# Patient Record
Sex: MALE | Race: WHITE | URBAN - METROPOLITAN AREA
[De-identification: names, ages, dates, MRNs, and addresses within clinical notes are randomized per-mention and may not be internally consistent; named-entity substitution may affect disease eponyms.]

---

## 2017-01-01 ENCOUNTER — DOCUMENTATION ONLY (OUTPATIENT)
Dept: ONCOLOGY | Facility: CLINIC | Age: 82
End: 2017-01-01

## 2017-01-01 ENCOUNTER — HOSPITAL ENCOUNTER (OUTPATIENT)
Dept: LAB | Facility: CLINIC | Age: 82
Discharge: HOME | End: 2017-01-18
Attending: INTERNAL MEDICINE | Admitting: INTERNAL MEDICINE
Payer: MEDICARE

## 2017-01-01 ENCOUNTER — CARE COORDINATION (OUTPATIENT)
Dept: CARE COORDINATION | Facility: CLINIC | Age: 82
End: 2017-01-01

## 2017-01-01 ENCOUNTER — TELEPHONE (OUTPATIENT)
Dept: ONCOLOGY | Facility: CLINIC | Age: 82
End: 2017-01-01

## 2017-01-01 ENCOUNTER — HOSPITAL ENCOUNTER (OUTPATIENT)
Dept: LAB | Facility: CLINIC | Age: 82
Discharge: HOME | End: 2017-01-11
Attending: INTERNAL MEDICINE | Admitting: INTERNAL MEDICINE
Payer: MEDICARE

## 2017-01-01 ENCOUNTER — HOSPITAL ENCOUNTER (EMERGENCY)
Facility: CLINIC | Age: 82
Discharge: HOME | End: 2017-01-11
Attending: PHYSICIAN ASSISTANT | Admitting: PHYSICIAN ASSISTANT
Payer: MEDICARE

## 2017-01-01 ENCOUNTER — APPOINTMENT (OUTPATIENT)
Dept: GENERAL RADIOLOGY | Facility: CLINIC | Age: 82
DRG: 808 | End: 2017-01-01
Attending: FAMILY MEDICINE
Payer: MEDICARE

## 2017-01-01 ENCOUNTER — HOSPITAL ENCOUNTER (INPATIENT)
Facility: CLINIC | Age: 82
LOS: 3 days | Discharge: HOSPICE | DRG: 808 | End: 2017-02-02
Attending: EMERGENCY MEDICINE | Admitting: FAMILY MEDICINE
Payer: MEDICARE

## 2017-01-01 ENCOUNTER — ONCOLOGY VISIT (OUTPATIENT)
Dept: ONCOLOGY | Facility: CLINIC | Age: 82
End: 2017-01-01
Attending: INTERNAL MEDICINE
Payer: MEDICARE

## 2017-01-01 VITALS
HEIGHT: 70 IN | HEART RATE: 78 BPM | BODY MASS INDEX: 22.16 KG/M2 | DIASTOLIC BLOOD PRESSURE: 68 MMHG | WEIGHT: 154.76 LBS | TEMPERATURE: 97.7 F | RESPIRATION RATE: 18 BRPM | SYSTOLIC BLOOD PRESSURE: 102 MMHG | OXYGEN SATURATION: 95 %

## 2017-01-01 VITALS
OXYGEN SATURATION: 98 % | TEMPERATURE: 96.8 F | SYSTOLIC BLOOD PRESSURE: 113 MMHG | BODY MASS INDEX: 24.83 KG/M2 | WEIGHT: 154.5 LBS | RESPIRATION RATE: 18 BRPM | HEIGHT: 66 IN | DIASTOLIC BLOOD PRESSURE: 61 MMHG | HEART RATE: 100 BPM

## 2017-01-01 VITALS
SYSTOLIC BLOOD PRESSURE: 115 MMHG | RESPIRATION RATE: 16 BRPM | HEART RATE: 96 BPM | TEMPERATURE: 97.7 F | BODY MASS INDEX: 24.83 KG/M2 | DIASTOLIC BLOOD PRESSURE: 66 MMHG | HEIGHT: 66 IN | WEIGHT: 154.5 LBS | OXYGEN SATURATION: 97 %

## 2017-01-01 VITALS — TEMPERATURE: 98.9 F | SYSTOLIC BLOOD PRESSURE: 113 MMHG | OXYGEN SATURATION: 95 % | DIASTOLIC BLOOD PRESSURE: 67 MMHG

## 2017-01-01 DIAGNOSIS — C90.00 METASTATIC MULTIPLE MYELOMA TO BONE (H): ICD-10-CM

## 2017-01-01 DIAGNOSIS — C79.51 BONE METASTASES: ICD-10-CM

## 2017-01-01 DIAGNOSIS — S51.011A SKIN TEAR OF ELBOW WITHOUT COMPLICATION, RIGHT, INITIAL ENCOUNTER: ICD-10-CM

## 2017-01-01 DIAGNOSIS — R62.7 FAILURE TO THRIVE IN ADULT: ICD-10-CM

## 2017-01-01 DIAGNOSIS — C90.00 MULTIPLE MYELOMA (H): ICD-10-CM

## 2017-01-01 DIAGNOSIS — C90.00 METASTATIC MULTIPLE MYELOMA TO BONE (H): Primary | ICD-10-CM

## 2017-01-01 DIAGNOSIS — C90.01 MULTIPLE MYELOMA IN REMISSION (H): Primary | ICD-10-CM

## 2017-01-01 DIAGNOSIS — C90.00 MULTIPLE MYELOMA NOT HAVING ACHIEVED REMISSION (H): ICD-10-CM

## 2017-01-01 DIAGNOSIS — R06.00 DYSPNEA, UNSPECIFIED TYPE: Primary | ICD-10-CM

## 2017-01-01 DIAGNOSIS — D64.9 ANEMIA: ICD-10-CM

## 2017-01-01 DIAGNOSIS — D64.9 ANEMIA, UNSPECIFIED TYPE: ICD-10-CM

## 2017-01-01 DIAGNOSIS — C90.01 MULTIPLE MYELOMA IN REMISSION (H): ICD-10-CM

## 2017-01-01 LAB
ABO + RH BLD: NORMAL
ABO + RH BLD: NORMAL
ALBUMIN SERPL ELPH-MCNC: 2.8 G/DL (ref 3.7–5.1)
ALBUMIN SERPL-MCNC: 1.2 G/DL (ref 3.4–5)
ALBUMIN SERPL-MCNC: 1.3 G/DL (ref 3.4–5)
ALBUMIN SERPL-MCNC: 1.9 G/DL (ref 3.4–5)
ALBUMIN UR-MCNC: NEGATIVE MG/DL
ALP SERPL-CCNC: 69 U/L (ref 40–150)
ALP SERPL-CCNC: 70 U/L (ref 40–150)
ALP SERPL-CCNC: 73 U/L (ref 40–150)
ALPHA1 GLOB SERPL ELPH-MCNC: 0.4 G/DL (ref 0.2–0.4)
ALPHA2 GLOB SERPL ELPH-MCNC: 0.6 G/DL (ref 0.5–0.9)
ALT SERPL W P-5'-P-CCNC: 7 U/L (ref 0–70)
ALT SERPL W P-5'-P-CCNC: 8 U/L (ref 0–70)
ALT SERPL W P-5'-P-CCNC: 9 U/L (ref 0–70)
ANION GAP SERPL CALCULATED.3IONS-SCNC: 10 MMOL/L (ref 3–14)
ANION GAP SERPL CALCULATED.3IONS-SCNC: 11 MMOL/L (ref 3–14)
ANION GAP SERPL CALCULATED.3IONS-SCNC: 8 MMOL/L (ref 3–14)
ANISOCYTOSIS BLD QL SMEAR: ABNORMAL
APPEARANCE UR: CLEAR
APTT PPP: 70 SEC (ref 22–37)
APTT PPP: 77 SEC (ref 22–37)
AST SERPL W P-5'-P-CCNC: 14 U/L (ref 0–45)
AST SERPL W P-5'-P-CCNC: 14 U/L (ref 0–45)
AST SERPL W P-5'-P-CCNC: 15 U/L (ref 0–45)
B-GLOBULIN SERPL ELPH-MCNC: 0.8 G/DL (ref 0.6–1)
BASOPHILS # BLD AUTO: 0 10E9/L (ref 0–0.2)
BASOPHILS # BLD AUTO: 0 10E9/L (ref 0–0.2)
BASOPHILS NFR BLD AUTO: 0.3 %
BASOPHILS NFR BLD AUTO: 1.1 %
BILIRUB SERPL-MCNC: 0.2 MG/DL (ref 0.2–1.3)
BILIRUB SERPL-MCNC: 0.2 MG/DL (ref 0.2–1.3)
BILIRUB SERPL-MCNC: 0.3 MG/DL (ref 0.2–1.3)
BILIRUB UR QL STRIP: NEGATIVE
BLD GP AB SCN SERPL QL: NORMAL
BLD PROD TYP BPU: NORMAL
BLD PROD TYP BPU: NORMAL
BLD UNIT ID BPU: 0
BLOOD BANK CMNT PATIENT-IMP: NORMAL
BLOOD PRODUCT CODE: NORMAL
BPU ID: NORMAL
BUN SERPL-MCNC: 28 MG/DL (ref 7–30)
BUN SERPL-MCNC: 32 MG/DL (ref 7–30)
BUN SERPL-MCNC: 42 MG/DL (ref 7–30)
CALCIUM SERPL-MCNC: 8.4 MG/DL (ref 8.5–10.1)
CALCIUM SERPL-MCNC: 8.6 MG/DL (ref 8.5–10.1)
CALCIUM SERPL-MCNC: 9.5 MG/DL (ref 8.5–10.1)
CHLORIDE SERPL-SCNC: 104 MMOL/L (ref 94–109)
CHLORIDE SERPL-SCNC: 106 MMOL/L (ref 94–109)
CHLORIDE SERPL-SCNC: 107 MMOL/L (ref 94–109)
CK SERPL-CCNC: 32 U/L (ref 30–300)
CO2 SERPL-SCNC: 22 MMOL/L (ref 20–32)
CO2 SERPL-SCNC: 23 MMOL/L (ref 20–32)
CO2 SERPL-SCNC: 25 MMOL/L (ref 20–32)
COLOR UR AUTO: YELLOW
CREAT SERPL-MCNC: 1.27 MG/DL (ref 0.66–1.25)
CREAT SERPL-MCNC: 1.34 MG/DL (ref 0.66–1.25)
CREAT SERPL-MCNC: 1.68 MG/DL (ref 0.66–1.25)
DIFFERENTIAL METHOD BLD: ABNORMAL
DIFFERENTIAL METHOD BLD: ABNORMAL
EOSINOPHIL # BLD AUTO: 0 10E9/L (ref 0–0.7)
EOSINOPHIL # BLD AUTO: 0 10E9/L (ref 0–0.7)
EOSINOPHIL NFR BLD AUTO: 0.6 %
EOSINOPHIL NFR BLD AUTO: 0.9 %
ERYTHROCYTE [DISTWIDTH] IN BLOOD BY AUTOMATED COUNT: 19.5 % (ref 10–15)
ERYTHROCYTE [DISTWIDTH] IN BLOOD BY AUTOMATED COUNT: 19.6 % (ref 10–15)
ERYTHROCYTE [DISTWIDTH] IN BLOOD BY AUTOMATED COUNT: 20.8 % (ref 10–15)
GAMMA GLOB SERPL ELPH-MCNC: 8.3 G/DL (ref 0.7–1.6)
GFR SERPL CREATININE-BSD FRML MDRD: 39 ML/MIN/1.7M2
GFR SERPL CREATININE-BSD FRML MDRD: 51 ML/MIN/1.7M2
GFR SERPL CREATININE-BSD FRML MDRD: 54 ML/MIN/1.7M2
GLUCOSE SERPL-MCNC: 117 MG/DL (ref 70–99)
GLUCOSE SERPL-MCNC: 81 MG/DL (ref 70–99)
GLUCOSE SERPL-MCNC: 87 MG/DL (ref 70–99)
GLUCOSE UR STRIP-MCNC: NEGATIVE MG/DL
HCT VFR BLD AUTO: 19.8 % (ref 40–53)
HCT VFR BLD AUTO: 20.6 % (ref 40–53)
HCT VFR BLD AUTO: 25.1 % (ref 40–53)
HGB BLD-MCNC: 6.4 G/DL (ref 13.3–17.7)
HGB BLD-MCNC: 6.7 G/DL (ref 13.3–17.7)
HGB BLD-MCNC: 8.1 G/DL (ref 13.3–17.7)
HGB UR QL STRIP: NEGATIVE
IGA SERPL-MCNC: 7980 MG/DL (ref 70–380)
IGG SERPL-MCNC: 188 MG/DL (ref 695–1620)
IGM SERPL-MCNC: 5 MG/DL (ref 60–265)
IMM GRANULOCYTES # BLD: 0 10E9/L (ref 0–0.4)
IMM GRANULOCYTES # BLD: 0 10E9/L (ref 0–0.4)
IMM GRANULOCYTES NFR BLD: 0.3 %
IMM GRANULOCYTES NFR BLD: 0.6 %
INR PPP: 1.61 (ref 0.86–1.14)
INR PPP: 1.84 (ref 0.86–1.14)
KAPPA LC UR-MCNC: 2.28 MG/DL (ref 0.33–1.94)
KAPPA LC/LAMBDA SER: 6.16 {RATIO} (ref 0.26–1.65)
KETONES UR STRIP-MCNC: NEGATIVE MG/DL
LAMBDA LC SERPL-MCNC: 0.37 MG/DL (ref 0.57–2.63)
LEUKOCYTE ESTERASE UR QL STRIP: NEGATIVE
LYMPHOCYTES # BLD AUTO: 1.6 10E9/L (ref 0.8–5.3)
LYMPHOCYTES # BLD AUTO: 1.7 10E9/L (ref 0.8–5.3)
LYMPHOCYTES NFR BLD AUTO: 43.6 %
LYMPHOCYTES NFR BLD AUTO: 48.1 %
M PROTEIN SERPL ELPH-MCNC: 8 G/DL
MCH RBC QN AUTO: 33 PG (ref 26.5–33)
MCH RBC QN AUTO: 33.6 PG (ref 26.5–33)
MCH RBC QN AUTO: 33.8 PG (ref 26.5–33)
MCHC RBC AUTO-ENTMCNC: 32.3 G/DL (ref 31.5–36.5)
MCHC RBC AUTO-ENTMCNC: 32.3 G/DL (ref 31.5–36.5)
MCHC RBC AUTO-ENTMCNC: 32.5 G/DL (ref 31.5–36.5)
MCV RBC AUTO: 102 FL (ref 78–100)
MCV RBC AUTO: 104 FL (ref 78–100)
MCV RBC AUTO: 104 FL (ref 78–100)
MONOCYTES # BLD AUTO: 0.2 10E9/L (ref 0–1.3)
MONOCYTES # BLD AUTO: 0.2 10E9/L (ref 0–1.3)
MONOCYTES NFR BLD AUTO: 4.9 %
MONOCYTES NFR BLD AUTO: 6.4 %
NEUTROPHILS # BLD AUTO: 1.6 10E9/L (ref 1.6–8.3)
NEUTROPHILS # BLD AUTO: 1.7 10E9/L (ref 1.6–8.3)
NEUTROPHILS NFR BLD AUTO: 45.2 %
NEUTROPHILS NFR BLD AUTO: 48 %
NITRATE UR QL: NEGATIVE
NUM BPU REQUESTED: 1
PH UR STRIP: 6.5 PH (ref 5–7)
PLATELET # BLD AUTO: 113 10E9/L (ref 150–450)
PLATELET # BLD AUTO: 93 10E9/L (ref 150–450)
PLATELET # BLD AUTO: 96 10E9/L (ref 150–450)
PLATELET # BLD EST: ABNORMAL 10*3/UL
POTASSIUM SERPL-SCNC: 4.3 MMOL/L (ref 3.4–5.3)
POTASSIUM SERPL-SCNC: 4.3 MMOL/L (ref 3.4–5.3)
POTASSIUM SERPL-SCNC: 4.5 MMOL/L (ref 3.4–5.3)
PROT PATTERN SERPL ELPH-IMP: ABNORMAL
PROT SERPL-MCNC: 11.3 G/DL (ref 6.8–8.8)
PROT SERPL-MCNC: 11.7 G/DL (ref 6.8–8.8)
PROT SERPL-MCNC: 12.4 G/DL (ref 6.8–8.8)
RBC # BLD AUTO: 1.94 10E12/L (ref 4.4–5.9)
RBC # BLD AUTO: 1.98 10E12/L (ref 4.4–5.9)
RBC # BLD AUTO: 2.41 10E12/L (ref 4.4–5.9)
ROULEAUX BLD QL SMEAR: ABNORMAL
SODIUM SERPL-SCNC: 137 MMOL/L (ref 133–144)
SODIUM SERPL-SCNC: 139 MMOL/L (ref 133–144)
SODIUM SERPL-SCNC: 140 MMOL/L (ref 133–144)
SP GR UR STRIP: 1.01 (ref 1–1.03)
SPECIMEN EXP DATE BLD: NORMAL
TRANSFUSION STATUS PATIENT QL: NORMAL
TRANSFUSION STATUS PATIENT QL: NORMAL
URN SPEC COLLECT METH UR: NORMAL
UROBILINOGEN UR STRIP-MCNC: NORMAL MG/DL (ref 0–2)
WBC # BLD AUTO: 3.5 10E9/L (ref 4–11)
WBC # BLD AUTO: 3.6 10E9/L (ref 4–11)
WBC # BLD AUTO: 3.8 10E9/L (ref 4–11)

## 2017-01-01 PROCEDURE — 12000007 ZZH R&B INTERMEDIATE

## 2017-01-01 PROCEDURE — 99356 ZZC PROLONGED SERV,INPATIENT,1ST HR: CPT | Performed by: NURSE PRACTITIONER

## 2017-01-01 PROCEDURE — 86901 BLOOD TYPING SEROLOGIC RH(D): CPT | Performed by: EMERGENCY MEDICINE

## 2017-01-01 PROCEDURE — 99212 OFFICE O/P EST SF 10 MIN: CPT

## 2017-01-01 PROCEDURE — 99214 OFFICE O/P EST MOD 30 MIN: CPT | Performed by: INTERNAL MEDICINE

## 2017-01-01 PROCEDURE — 99213 OFFICE O/P EST LOW 20 MIN: CPT | Performed by: PHYSICIAN ASSISTANT

## 2017-01-01 PROCEDURE — 25000132 ZZH RX MED GY IP 250 OP 250 PS 637: Mod: GY | Performed by: FAMILY MEDICINE

## 2017-01-01 PROCEDURE — A9270 NON-COVERED ITEM OR SERVICE: HCPCS | Mod: GY | Performed by: NURSE PRACTITIONER

## 2017-01-01 PROCEDURE — 25000125 ZZHC RX 250: Performed by: FAMILY MEDICINE

## 2017-01-01 PROCEDURE — 86900 BLOOD TYPING SEROLOGIC ABO: CPT | Performed by: EMERGENCY MEDICINE

## 2017-01-01 PROCEDURE — 85025 COMPLETE CBC W/AUTO DIFF WBC: CPT | Performed by: STUDENT IN AN ORGANIZED HEALTH CARE EDUCATION/TRAINING PROGRAM

## 2017-01-01 PROCEDURE — 86923 COMPATIBILITY TEST ELECTRIC: CPT | Performed by: EMERGENCY MEDICINE

## 2017-01-01 PROCEDURE — 82550 ASSAY OF CK (CPK): CPT | Performed by: FAMILY MEDICINE

## 2017-01-01 PROCEDURE — A9270 NON-COVERED ITEM OR SERVICE: HCPCS | Mod: GY | Performed by: FAMILY MEDICINE

## 2017-01-01 PROCEDURE — 99239 HOSP IP/OBS DSCHRG MGMT >30: CPT | Performed by: INTERNAL MEDICINE

## 2017-01-01 PROCEDURE — 85730 THROMBOPLASTIN TIME PARTIAL: CPT | Performed by: FAMILY MEDICINE

## 2017-01-01 PROCEDURE — 99285 EMERGENCY DEPT VISIT HI MDM: CPT | Mod: 25

## 2017-01-01 PROCEDURE — 80053 COMPREHEN METABOLIC PANEL: CPT | Performed by: STUDENT IN AN ORGANIZED HEALTH CARE EDUCATION/TRAINING PROGRAM

## 2017-01-01 PROCEDURE — 99211 OFF/OP EST MAY X REQ PHY/QHP: CPT

## 2017-01-01 PROCEDURE — 80053 COMPREHEN METABOLIC PANEL: CPT | Performed by: FAMILY MEDICINE

## 2017-01-01 PROCEDURE — 85610 PROTHROMBIN TIME: CPT | Performed by: EMERGENCY MEDICINE

## 2017-01-01 PROCEDURE — P9016 RBC LEUKOCYTES REDUCED: HCPCS | Performed by: EMERGENCY MEDICINE

## 2017-01-01 PROCEDURE — 36430 TRANSFUSION BLD/BLD COMPNT: CPT

## 2017-01-01 PROCEDURE — 36415 COLL VENOUS BLD VENIPUNCTURE: CPT | Performed by: INTERNAL MEDICINE

## 2017-01-01 PROCEDURE — 12000000 ZZH R&B MED SURG/OB

## 2017-01-01 PROCEDURE — 99231 SBSQ HOSP IP/OBS SF/LOW 25: CPT | Performed by: INTERNAL MEDICINE

## 2017-01-01 PROCEDURE — 36415 COLL VENOUS BLD VENIPUNCTURE: CPT | Performed by: FAMILY MEDICINE

## 2017-01-01 PROCEDURE — 80053 COMPREHEN METABOLIC PANEL: CPT | Performed by: INTERNAL MEDICINE

## 2017-01-01 PROCEDURE — 81003 URINALYSIS AUTO W/O SCOPE: CPT | Performed by: STUDENT IN AN ORGANIZED HEALTH CARE EDUCATION/TRAINING PROGRAM

## 2017-01-01 PROCEDURE — 99223 1ST HOSP IP/OBS HIGH 75: CPT | Mod: AI | Performed by: FAMILY MEDICINE

## 2017-01-01 PROCEDURE — 99285 EMERGENCY DEPT VISIT HI MDM: CPT | Performed by: EMERGENCY MEDICINE

## 2017-01-01 PROCEDURE — 85025 COMPLETE CBC W/AUTO DIFF WBC: CPT | Performed by: INTERNAL MEDICINE

## 2017-01-01 PROCEDURE — 71020 XR CHEST 2 VW: CPT

## 2017-01-01 PROCEDURE — 85027 COMPLETE CBC AUTOMATED: CPT | Performed by: FAMILY MEDICINE

## 2017-01-01 PROCEDURE — 25000132 ZZH RX MED GY IP 250 OP 250 PS 637: Mod: GY | Performed by: NURSE PRACTITIONER

## 2017-01-01 PROCEDURE — 85730 THROMBOPLASTIN TIME PARTIAL: CPT | Performed by: EMERGENCY MEDICINE

## 2017-01-01 PROCEDURE — 86850 RBC ANTIBODY SCREEN: CPT | Performed by: EMERGENCY MEDICINE

## 2017-01-01 PROCEDURE — 85610 PROTHROMBIN TIME: CPT | Performed by: FAMILY MEDICINE

## 2017-01-01 PROCEDURE — 99223 1ST HOSP IP/OBS HIGH 75: CPT | Performed by: NURSE PRACTITIONER

## 2017-01-01 RX ORDER — ACETAMINOPHEN 325 MG/1
650 TABLET ORAL EVERY 4 HOURS PRN
Status: DISCONTINUED | OUTPATIENT
Start: 2017-01-01 | End: 2017-01-01 | Stop reason: HOSPADM

## 2017-01-01 RX ORDER — MORPHINE SULFATE 20 MG/ML
5-10 SOLUTION ORAL
Qty: 30 ML | Refills: 0 | Status: SHIPPED | OUTPATIENT
Start: 2017-01-01

## 2017-01-01 RX ORDER — PHYTONADIONE 5 MG/1
5 TABLET ORAL ONCE
Status: COMPLETED | OUTPATIENT
Start: 2017-01-01 | End: 2017-01-01

## 2017-01-01 RX ORDER — ONDANSETRON 2 MG/ML
4 INJECTION INTRAMUSCULAR; INTRAVENOUS EVERY 6 HOURS PRN
Status: DISCONTINUED | OUTPATIENT
Start: 2017-01-01 | End: 2017-01-01 | Stop reason: HOSPADM

## 2017-01-01 RX ORDER — METOPROLOL TARTRATE 25 MG/1
25 TABLET, FILM COATED ORAL 2 TIMES DAILY
Status: DISCONTINUED | OUTPATIENT
Start: 2017-01-01 | End: 2017-01-01 | Stop reason: HOSPADM

## 2017-01-01 RX ORDER — PROCHLORPERAZINE 25 MG
12.5 SUPPOSITORY, RECTAL RECTAL EVERY 12 HOURS PRN
Status: DISCONTINUED | OUTPATIENT
Start: 2017-01-01 | End: 2017-01-01 | Stop reason: HOSPADM

## 2017-01-01 RX ORDER — OXYCODONE HYDROCHLORIDE 5 MG/1
5-10 TABLET ORAL
Status: DISCONTINUED | OUTPATIENT
Start: 2017-01-01 | End: 2017-01-01 | Stop reason: HOSPADM

## 2017-01-01 RX ORDER — NALOXONE HYDROCHLORIDE 0.4 MG/ML
.1-.4 INJECTION, SOLUTION INTRAMUSCULAR; INTRAVENOUS; SUBCUTANEOUS
Status: DISCONTINUED | OUTPATIENT
Start: 2017-01-01 | End: 2017-01-01 | Stop reason: HOSPADM

## 2017-01-01 RX ORDER — AMOXICILLIN 250 MG
1-2 CAPSULE ORAL AT BEDTIME
Qty: 100 TABLET | Refills: 0 | DISCHARGE
Start: 2017-01-01

## 2017-01-01 RX ORDER — POTASSIUM CHLORIDE 1500 MG/1
20 TABLET, EXTENDED RELEASE ORAL DAILY
Status: DISCONTINUED | OUTPATIENT
Start: 2017-01-01 | End: 2017-01-01 | Stop reason: HOSPADM

## 2017-01-01 RX ORDER — LISINOPRIL 2.5 MG/1
2.5 TABLET ORAL DAILY
Status: DISCONTINUED | OUTPATIENT
Start: 2017-01-01 | End: 2017-01-01

## 2017-01-01 RX ORDER — MORPHINE SULFATE 20 MG/ML
5 SOLUTION ORAL
Status: DISCONTINUED | OUTPATIENT
Start: 2017-01-01 | End: 2017-01-01 | Stop reason: HOSPADM

## 2017-01-01 RX ORDER — PROCHLORPERAZINE MALEATE 5 MG
5 TABLET ORAL EVERY 6 HOURS PRN
Status: DISCONTINUED | OUTPATIENT
Start: 2017-01-01 | End: 2017-01-01 | Stop reason: HOSPADM

## 2017-01-01 RX ORDER — ONDANSETRON 4 MG/1
4 TABLET, ORALLY DISINTEGRATING ORAL EVERY 6 HOURS PRN
Status: DISCONTINUED | OUTPATIENT
Start: 2017-01-01 | End: 2017-01-01 | Stop reason: HOSPADM

## 2017-01-01 RX ORDER — AMOXICILLIN 250 MG
1-2 CAPSULE ORAL AT BEDTIME
Status: DISCONTINUED | OUTPATIENT
Start: 2017-01-01 | End: 2017-01-01 | Stop reason: HOSPADM

## 2017-01-01 RX ORDER — POLYETHYLENE GLYCOL 3350 17 G/17G
17 POWDER, FOR SOLUTION ORAL DAILY PRN
Status: DISCONTINUED | OUTPATIENT
Start: 2017-01-01 | End: 2017-01-01 | Stop reason: HOSPADM

## 2017-01-01 RX ADMIN — PHYTONADIONE 5 MG: 5 TABLET ORAL at 21:00

## 2017-01-01 RX ADMIN — OMEPRAZOLE 20 MG: 20 CAPSULE, DELAYED RELEASE ORAL at 08:05

## 2017-01-01 RX ADMIN — METOPROLOL TARTRATE 25 MG: 25 TABLET ORAL at 08:34

## 2017-01-01 RX ADMIN — METOPROLOL TARTRATE 25 MG: 25 TABLET ORAL at 08:22

## 2017-01-01 RX ADMIN — NEPAFENAC 1 DROP: 1 SUSPENSION/ DROPS OPHTHALMIC at 08:06

## 2017-01-01 RX ADMIN — POTASSIUM CHLORIDE 20 MEQ: 1500 TABLET, EXTENDED RELEASE ORAL at 08:05

## 2017-01-01 RX ADMIN — OMEPRAZOLE 20 MG: 20 CAPSULE, DELAYED RELEASE ORAL at 08:34

## 2017-01-01 RX ADMIN — LISINOPRIL 2.5 MG: 2.5 TABLET ORAL at 08:05

## 2017-01-01 RX ADMIN — NEPAFENAC 1 DROP: 1 SUSPENSION/ DROPS OPHTHALMIC at 08:24

## 2017-01-01 RX ADMIN — NEPAFENAC 1 DROP: 1 SUSPENSION/ DROPS OPHTHALMIC at 08:34

## 2017-01-01 RX ADMIN — POTASSIUM CHLORIDE 20 MEQ: 1500 TABLET, EXTENDED RELEASE ORAL at 08:34

## 2017-01-01 RX ADMIN — METOPROLOL TARTRATE 25 MG: 25 TABLET ORAL at 18:49

## 2017-01-01 RX ADMIN — METOPROLOL TARTRATE 25 MG: 25 TABLET ORAL at 20:25

## 2017-01-01 RX ADMIN — POTASSIUM CHLORIDE 20 MEQ: 1500 TABLET, EXTENDED RELEASE ORAL at 08:23

## 2017-01-01 RX ADMIN — METOPROLOL TARTRATE 25 MG: 25 TABLET ORAL at 08:05

## 2017-01-01 RX ADMIN — OMEPRAZOLE 20 MG: 20 CAPSULE, DELAYED RELEASE ORAL at 08:23

## 2017-01-01 RX ADMIN — METOPROLOL TARTRATE 25 MG: 25 TABLET ORAL at 21:00

## 2017-01-01 RX ADMIN — NEPAFENAC 1 DROP: 1 SUSPENSION/ DROPS OPHTHALMIC at 18:48

## 2017-01-01 RX ADMIN — NEPAFENAC 1 DROP: 1 SUSPENSION/ DROPS OPHTHALMIC at 20:25

## 2017-01-01 ASSESSMENT — ENCOUNTER SYMPTOMS
DIZZINESS: 0
BLOOD IN STOOL: 0
SEIZURES: 0
VOMITING: 0
SHORTNESS OF BREATH: 0
NAUSEA: 0
CHILLS: 0
NECK PAIN: 0
FATIGUE: 0
CHEST TIGHTNESS: 0
SORE THROAT: 0
FEVER: 0
PALPITATIONS: 0
COUGH: 1
HEADACHES: 0
BACK PAIN: 1
WEAKNESS: 1
STRIDOR: 0
ABDOMINAL PAIN: 0
SPEECH DIFFICULTY: 0
DIARRHEA: 1
WHEEZING: 0
CONSTIPATION: 0
BRUISES/BLEEDS EASILY: 1
DYSURIA: 0
FREQUENCY: 0
NUMBNESS: 0
CONFUSION: 0
DIAPHORESIS: 0
LIGHT-HEADEDNESS: 0

## 2017-01-01 ASSESSMENT — ACTIVITIES OF DAILY LIVING (ADL)
RETIRED_COMMUNICATION: 0-->UNDERSTANDS/COMMUNICATES WITHOUT DIFFICULTY
FALL_HISTORY_WITHIN_LAST_SIX_MONTHS: NO
SWALLOWING: 0-->SWALLOWS FOODS/LIQUIDS WITHOUT DIFFICULTY
TRANSFERRING: 2-->ASSISTIVE PERSON
BATHING: 2-->ASSISTIVE PERSON
TOILETING: 2-->ASSISTIVE PERSON
DRESS: 2-->ASSISTIVE PERSON
RETIRED_EATING: 0-->INDEPENDENT
COMMUNICATION: 0-->UNDERSTANDS/COMMUNICATES WITHOUT DIFFICULTY
AMBULATION: 1-->ASSISTIVE EQUIPMENT
CHANGE_IN_FUNCTIONAL_STATUS_SINCE_ONSET_OF_CURRENT_ILLNESS/INJURY: OTHER (SEE COMMENTS)
TRANSFERRING: 1-->ASSISTIVE EQUIPMENT
BATHING: 2-->ASSISTIVE PERSON
TOILETING: 2-->ASSISTIVE PERSON
SWALLOWING: 0-->SWALLOWS FOODS/LIQUIDS WITHOUT DIFFICULTY
COGNITION: 1 - ATTENTION OR MEMORY DEFICITS
DRESS: 2-->ASSISTIVE PERSON
EATING: 0-->INDEPENDENT
AMBULATION: 1-->ASSISTIVE EQUIPMENT

## 2017-01-01 ASSESSMENT — PAIN SCALES - GENERAL
PAINLEVEL: NO PAIN (0)
PAINLEVEL: MODERATE PAIN (5)

## 2017-01-10 NOTE — PROGRESS NOTES
Clinic Care Coordination Contact  Care Team Conversations    Patient was last contacted 10/4/16 date. Chart reviewed. No concerns noted at this time. Patient has not outreached to RN CC.  Patient has a letter with my contact information and access plan to reference if needed. Will close to active CC outreaches at this time.    Cata Yang RN, [facility]  RN Care Coordinator  [facility]  Phone # [phone]  1/10/2017 9:22 AM

## 2017-01-11 NOTE — ASSESSMENT & PLAN NOTE
Keven Jasso was diagnosis with ISS Stage II IgA kappa multiple myeloma, high risk with complex cytogenetics and karyotype 53 XY. He was initially admitted to the hospital on 11/27/2012 for generalized fatigue, loss of energy, muscle aches and exertional dyspnea. He was found to have hemoglobin of 8.8, creatinine 2.4, calcium 13.9 with a total serum protein of 12.3. Further evaluation revealed a monoclonal protein of 6.5 gram per deciliter IgA kappa and free light chain ratio 1.7, IgA of 2300. His skeletal survey also revealed multiple bone lesions consistent with multiple myeloma. Bone marrow evaluation on 12/04/2012 showed 75% monoclonal plasma cells and chromosomal analysis showed 53xy karyotype. There is a gain of 1 extra copy of chromosomes pgs 3, 5, 9, 15 and 19 as well as the gain of 2 extra copies of chromosome 21 so there was also evidence of translocation 8:12 and 8:16 as well with deletion of 6 and 14 and chromosome 17. Patient was started on Decadron 40 mg p.o. weekly and Velcade 1.3 mg per m2 days 1, 4, 8 and 11 for cycle 1 followed by weekly Velcade. He had VGPR (very good partial response). His M protein went down to 0.2 g/dL, and his hemoglobin and creatinine and serum calcium as well as pain within normal limits. His FLC was also within normal limits. M-protein was 0.5 g/dl on 02/19/14. Because of disease progression, the patient started on chemotherapy with Revlimid, and dexamethasone. Because of disease progression the patient was started on Pomalidamide and dexamethasone. Cytoxan was also added however the patient continued to progress.

## 2017-01-11 NOTE — PATIENT INSTRUCTIONS
We would like to see you back in clinic with Dr. Mesa in 1 week.  We are sending you to the ER for Xray, wound dressing, and admittance until you can be placed in a nursing home.  Chemotherapy will be delayed for 1 week.  Copy of appointments, and after visit summary (AVS) given to patient.  If you have any questions during business hours (M-F 8 AM- 4PM), please call Olga Jiménez RN, BSN, OCN Oncology Hematology /Breast Cancer Navigator at [facility] [phone].   For questions after business hours, or on holidays/weekends, please call our after hours Nurse Triage line [phone]. Thank you.

## 2017-01-11 NOTE — ED AVS SNAPSHOT
[facility] Emergency Department    [address] [facility] [address] [address]    Phone:  [phone]    Fax:  [phone]                                       Keven Jasso   MRN: [id number]    Department:  [facility] Emergency Department   Date of Visit:  1/11/2017           After Visit Summary Signature Page     I have received my discharge instructions, and my questions have been answered. I have discussed any challenges I see with this plan with the nurse or doctor.    ..........................................................................................................................................  Patient/Patient Representative Signature      ..........................................................................................................................................  Patient Representative Print Name and Relationship to Patient    ..................................................               ................................................  Date                                            Time    ..........................................................................................................................................  Reviewed by Signature/Title    ...................................................              ..............................................  Date                                                            Time

## 2017-01-11 NOTE — DISCHARGE INSTRUCTIONS
Old Laceration: Not Sutured  A laceration is a cut through the skin. This will usually require stitches if it is deep. However, if a laceration remains open for too long, the risk of infection increases. In your case, too much time has passed before coming for treatment. The danger of infection from suturing at this time is too high. That is why your wound was not sutured.  If the wound is spread open, it will heal by filling in from the bottom and sides. A wound that is not sutured may take 1 to 4 weeks to heal, depending on the size of the opening. A visible scar will probably occur. You can discuss revision of the scar with your healthcare provider at a later time.  Home care  The following guidelines will help you care for your laceration at home:    Keep the wound clean and dry. If a bandage was applied and it becomes wet or dirty, replace it. Otherwise, leave it in place for the first 24 hours, then change it once a day or as directed.    Clean the wound daily:    After removing any bandage, wash the area with soap and water. Use a wet cotton swab to loosen and remove any blood or crust that forms.    Talk with your doctor before applying any antibiotic ointment to the wound. Reapply a fresh bandage.    You may remove the bandage to shower as usual after the first 24 hours, but do not soak the area in water (no tub baths or swimming) for the next five days. Avoid activities that may reinjure your wound.    The healthcare provider may prescribe an antibiotic cream or ointment to prevent infection. Do not stop using this medication until you have finished the prescribed course or the provider tells you to stop.    The healthcare provider may also prescribe medications for pain. Follow instructions for taking these medications.    Check the wound daily for signs of infection listed below.  Follow-up care  Follow up with your healthcare provider as advised.  When to seek medical advice  Call your healthcare  provider right away if any of these occur:    Wound bleeding not controlled by direct pressure    Signs of infection, including increasing pain in the wound, increasing wound redness or swelling, or pus or bad odor coming from the wound    Fever of 100.4 F (38. C) or higher or as directed by your healthcare provider    Wound edges re-open    Wound changes colors    Numbness around the wound     Decreased movement around the injured area    [id number] [employer]. [address] [place] [address], [place] [id number]. All rights reserved. This information is not intended as a substitute for professional medical care. Always follow your healthcare professional's instructions.

## 2017-01-11 NOTE — ED PROVIDER NOTES
History     Chief Complaint   Patient presents with     Laceration     HPI  Keven Jasso is a 83 year old male who returns or laceration to his right elbow which occurred one week ago.  He and family states he sustained a superficial skin tag to the area after he hit it on something.  Since then he has had intermittent bleeding from the lesion with dressing changes which they are performing every other day.  Today when he was receiving infusion therapy provider noted a significant amount of dried blood on the forearm and recommended he present to urgent care for dressing change and evaluation.  He denies any significant pain in the area.  He has not had any new onset erythema, distal numbness or paresthesias.  No concerns of a foreign body in the wound.  His tetanus vaccine is up-to-date.    Past Medical History   Diagnosis Date     Pulmonary valve disorders      Cardiac Valvular Disease     Profound impairment, one eye, impairment level not further specified      Right Eye Blindness     Unspecified essential hypertension      Other and unspecified hyperlipidemia      Current Outpatient Prescriptions   Medication Sig Dispense Refill     ferrous sulfate (IRON) 325 (65 FE) MG tablet Take 1 tablet (325 mg) by mouth 2 times daily 60 tablet 11     potassium chloride SA (K-DUR/KLOR-CON M) 20 MEQ CR tablet Take 1 tablet (20 mEq) by mouth daily 30 tablet 0     omeprazole (PRILOSEC) 20 MG capsule Take 1 capsule (20 mg) by mouth daily 30 capsule 3     furosemide (LASIX) 20 MG tablet Take 1 tablet (20 mg) by mouth daily 30 tablet 3     metoprolol (LOPRESSOR) 25 MG tablet Take 1 tablet (25 mg) by mouth 2 times daily 180 tablet 3     multivitamin, therapeutic with minerals (THERA-VIT-M) TABS Take 1 tablet by mouth daily 30 each 0     LISINOPRIL PO Take 2.5 mg by mouth daily        nepafenac (NEVANAC) 0.1 % ophthalmic suspension Place 1 drop Into the left eye 2 times daily 1 Bottle 3     Social History   Substance Use  Topics     Smoking status: Former Smoker -- 1.00 packs/day for 30 years     Types: Pipe, Cigars     Quit date: 03/15/1991     Smokeless tobacco: Never Used     Alcohol Use: Yes      Comment: VERY RARE     I have reviewed the Medications, Allergies, Past Medical and Surgical History, and Social History in the Epic system.    Review of Systems  CONSTITUTIONAL:NEGATIVE for new onset fever, chills,   INTEGUMENTARY/SKIN: POSITIVE for laceration/skin tag to arm with intermittent bleeding  RESP:NEGATIVE for significant cough or SOB  MUSCULOSKELETAL: NEGATIVE for new concerning significant arthralgias or myalgia  NEURO: NEGATIVE for distal numbness or paresthesias in his right upper extremity    Physical Exam   /67 mmHg  Temp(Src) 98.9  F (37.2  C) (Temporal)  SpO2 95%  Physical Exam  GENERAL APPEARANCE:alert and no acute distress  RESP: lungs clear to auscultation - no rales, rhonchi or wheezes  CV: regular rates and rhythm, normal S1 S2, no murmur noted, right radial pulse intact  SKIN:  Large amount of dried blood on right forearm and elbow, once removed there is a skin tear/abrasion which measures approximately 5 cm by 7 cm on the lateral aspect of the right elbow, no active bleeding at this time.    MS:  Normal ROM of right elbow, no focal bony tenderness to palpation  ED Course   Procedures        Critical Care time:  none            Labs Ordered and Resulted from Time of ED Arrival Up to the Time of Departure from the ED - No data to display    Assessments & Plan (with Medical Decision Making)     I have reviewed the nursing notes.    I have reviewed the findings, diagnosis, plan and need for follow up with the patient.    Discharge Medication List as of 1/11/2017  3:27 PM        Final diagnoses:   Skin tear of elbow without complication, right, initial encounter     83-year-old who presents to urgent care with concerns of her laceration/abrasion to his right elbow which occurred one week prior to arrival but  continues to have intermittent bleeding.  Patient has stable vital signs upon arrival.  Physical exam findings were significant for a large amount of dried blood on his right forearm.  This was removed with saline by LPN ultimately revealing a 5 cm x 7 cm skin tear/superficial abrasion to the lateral aspect of his right elbow without any active bleeding at this time.  Dressing instructions discussed with patient and family member.  Patient has home nursing who can assist with regular dressing changes.  Expected course of healing, signs of infection, worrisome reasons to return to ER/UC discussed.  Follow up as needed.      Disclaimer: This note consists of symbols derived from keyboarding, dictation, and/or voice recognition software. As a result, there may be errors in the script that have gone undetected.  Please consider this when interpreting information found in the chart.    1/11/2017   [facility] EMERGENCY DEPARTMENT      Janiya Durand PA-C  01/21/17 1126

## 2017-01-11 NOTE — NURSING NOTE
"Keven Jasso is a 83 year old male who presents for:  Chief Complaint   Patient presents with     Oncology Clinic Visit     6 week recheck Multiple Myeloma, Labs & Chemo today        Initial Vitals:  /61 mmHg  Pulse 100  Temp(Src) 96.8  F (36  C) (Tympanic)  Resp 18  Ht 1.676 m (5' 5.98\")  Wt 70.081 kg (154 lb 8 oz)  BMI 24.95 kg/m2  SpO2 98% Estimated body mass index is 24.95 kg/(m^2) as calculated from the following:    Height as of this encounter: 1.676 m (5' 5.98\").    Weight as of this encounter: 70.081 kg (154 lb 8 oz).. Body surface area is 1.81 meters squared. BP completed using cuff size: regular  Moderate Pain (5) No LMP for male patient. Allergies and medications reviewed.     Medications: Medication refills not needed today.  Pharmacy name entered into [employer]: [facility] [place], [place] - [address] [place] [address]    Comments: 6 week recheck Multiple Myeloma, Labs & Chemo today. Patient reports coughing for the last 1-2 weeks. Gilmar nose is bleeding today & I noticed some scratches on his forehead to which Gilmar says he ran into a door at home. Gilmar also reports that the home care nurse is not coming anymore due to financial reasons.     10  minutes for nursing intake (face to face time)   Deepali Webber CMA          "

## 2017-01-11 NOTE — PROGRESS NOTES
Hematology/ Oncology Follow-up Visit:  Jan 11, 2017    Reason for Visit:   Chief Complaint   Patient presents with     Oncology Clinic Visit     6 week recheck Multiple Myeloma, Labs & Chemo today       Oncologic History:  Multiple myeloma (FAYE Jasso was diagnosis with ISS Stage II IgA kappa multiple myeloma, high risk with complex cytogenetics and karyotype 53 XY. He was initially admitted to the hospital on 11/27/2012 for generalized fatigue, loss of energy, muscle aches and exertional dyspnea. He was found to have hemoglobin of 8.8, creatinine 2.4, calcium 13.9 with a total serum protein of 12.3. Further evaluation revealed a monoclonal protein of 6.5 gram per deciliter IgA kappa and free light chain ratio 1.7, IgA of 2300. His skeletal survey also revealed multiple bone lesions consistent with multiple myeloma. Bone marrow evaluation on 12/04/2012 showed 75% monoclonal plasma cells and chromosomal analysis showed 53xy karyotype. There is a gain of 1 extra copy of chromosomes pgs 3, 5, 9, 15 and 19 as well as the gain of 2 extra copies of chromosome 21 so there was also evidence of translocation 8:12 and 8:16 as well with deletion of 6 and 14 and chromosome 17. Patient was started on Decadron 40 mg p.o. weekly and Velcade 1.3 mg per m2 days 1, 4, 8 and 11 for cycle 1 followed by weekly Velcade. He had VGPR (very good partial response). His M protein went down to 0.2 g/dL, and his hemoglobin and creatinine and serum calcium as well as pain within normal limits. His FLC was also within normal limits. M-protein was 0.5 g/dl on 02/19/14. Because of disease progression, the patient started on chemotherapy with Revlimid, and dexamethasone. Because of disease progression the patient was started on Pomalidamide and dexamethasone. Cytoxan was also added however the patient continued to progress.      Interval History:  Patient is seen today for follow-up. Over the last few weeks he is been gradually  "deteriorating. His appetite has been poor. His activity at home has been unable His been most of the day in his bed. Is getting occasional lower back pain. He does not use pain medication on a regular basis. He has multiple falls.     Review Of Systems:  All other ROS negative unless mentioned in interval history.    Past medical, social, surgical, and family histories reviewed.    Allergies:  Allergies as of 01/11/2017     (No Known Allergies)       Current Medications:  Current Outpatient Prescriptions   Medication Sig Dispense Refill     ferrous sulfate (IRON) 325 (65 FE) MG tablet Take 1 tablet (325 mg) by mouth 2 times daily 60 tablet 11     potassium chloride SA (K-DUR/KLOR-CON M) 20 MEQ CR tablet Take 1 tablet (20 mEq) by mouth daily 30 tablet 0     omeprazole (PRILOSEC) 20 MG capsule Take 1 capsule (20 mg) by mouth daily 30 capsule 3     furosemide (LASIX) 20 MG tablet Take 1 tablet (20 mg) by mouth daily 30 tablet 3     metoprolol (LOPRESSOR) 25 MG tablet Take 1 tablet (25 mg) by mouth 2 times daily 180 tablet 3     multivitamin, therapeutic with minerals (THERA-VIT-M) TABS Take 1 tablet by mouth daily 30 each 0     LISINOPRIL PO Take 2.5 mg by mouth daily        nepafenac (NEVANAC) 0.1 % ophthalmic suspension Place 1 drop Into the left eye 2 times daily 1 Bottle 3        Physical Exam:  /61 mmHg  Pulse 100  Temp(Src) 96.8  F (36  C) (Tympanic)  Resp 18  Ht 1.676 m (5' 5.98\")  Wt 70.081 kg (154 lb 8 oz)  BMI 24.95 kg/m2  SpO2 98%  Wt Readings from Last 12 Encounters:   01/11/17 70.081 kg (154 lb 8 oz)   12/21/16 73.664 kg (162 lb 6.4 oz)   12/14/16 72.167 kg (159 lb 1.6 oz)   11/30/16 71.623 kg (157 lb 14.4 oz)   11/16/16 71.305 kg (157 lb 3.2 oz)   10/26/16 75.932 kg (167 lb 6.4 oz)   10/19/16 75.66 kg (166 lb 12.8 oz)   10/12/16 74.662 kg (164 lb 9.6 oz)   09/28/16 78.382 kg (172 lb 12.8 oz)   09/14/16 77.565 kg (171 lb)   08/31/16 77.066 kg (169 lb 14.4 oz)   08/17/16 77.701 kg (171 lb 4.8 " oz)     ECOG performance status: 2  GENERAL APPEARANCE: Healthy, alert and in no acute distress. Muscle wasting.  HEENT: Sclerae anicteric. PERRLA. Oropharynx without ulcers, lesions, or thrush.  NECK: Supple. No asymmetry or masses.  LYMPHATICS: No palpable cervical, supraclavicular, axillary, or inguinal lymphadenopathy.  RESP: Lungs clear to auscultation bilaterally without rales, rhonchi or wheezes.  CARDIOVASCULAR: Regular rate and rhythm. Normal S1, S2; no S3 or S4. No murmur, gallop, or rub.  ABDOMEN: Soft, nontender. Bowel sounds normal. No palpable organomegaly or masses.  MUSCULOSKELETAL: There is  Wound in the right elbow area. NEURO: Alert and oriented x 3. Cranial nerves II-XII grossly intact.  PSYCHIATRIC: Mentation and affect appear normal.    Laboratory/Imaging Studies:  No visits with results within 2 Week(s) from this visit.  Latest known visit with results is:    Infusion Therapy Visit on 12/28/2016   Component Date Value Ref Range Status     WBC 12/28/2016 2.6* 4.0 - 11.0 10e9/L Final     RBC Count 12/28/2016 2.17* 4.4 - 5.9 10e12/L Final     Hemoglobin 12/28/2016 7.2* 13.3 - 17.7 g/dL Final     Hematocrit 12/28/2016 22.6* 40.0 - 53.0 % Final     MCV 12/28/2016 104* 78 - 100 fl Final     MCH 12/28/2016 33.2* 26.5 - 33.0 pg Final     MCHC 12/28/2016 31.9  31.5 - 36.5 g/dL Final     RDW 12/28/2016 19.8* 10.0 - 15.0 % Final     Platelet Count 12/28/2016 123* 150 - 450 10e9/L Final     Diff Method 12/28/2016 Manual Differential   Final     % Neutrophils 12/28/2016 48.0   Final     % Lymphocytes 12/28/2016 38.0   Final     % Monocytes 12/28/2016 11.0   Final     % Eosinophils 12/28/2016 3.0   Final     % Basophils 12/28/2016 0.0   Final     Nucleated RBCs 12/28/2016 7* 0 /100 Final     Absolute Neutrophil 12/28/2016 1.2* 1.6 - 8.3 10e9/L Final     Absolute Lymphocytes 12/28/2016 1.0  0.8 - 5.3 10e9/L Final     Absolute Monocytes 12/28/2016 0.3  0.0 - 1.3 10e9/L Final     Absolute Eosinophils  12/28/2016 0.1  0.0 - 0.7 10e9/L Final     Absolute Basophils 12/28/2016 0.0  0.0 - 0.2 10e9/L Final     Absolute Nucleated RBC 12/28/2016 0.2   Final     Anisocytosis 12/28/2016 Moderate   Final     Hypochromasia 12/28/2016 Present   Final     Platelet Estimate 12/28/2016 Decreased   Final     Units Ordered 12/28/2016 1   Final     ABO 12/28/2016 A   Final     RH(D) 12/28/2016  Pos   Final     Antibody Screen 12/28/2016 Neg   Final     Test Valid Only At 12/28/2016 [facility]   Final     Specimen Expires 12/28/2016 12/31/2016   Final     Crossmatch 12/28/2016 Red Blood Cells   Final     Unit Number 12/28/2016 [id number]   Final     Blood Component Type 12/28/2016 Red Blood Cells Leukocyte Reduced   Final     Division Number 12/28/2016 00   Final     Status of Unit 12/28/2016 Released to care unit   Final     Blood Product Code 12/28/2016 [id number]   Final     Unit Status 12/28/2016 ISS   Final        No results found for this or any previous visit (from the past 744 hour(s)).    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 Today we will arrange for restaging workup. I would like the patient to be evaluated by  looking into the home situation and the possibility of a nursing home placement.  Because of the wound of the right elbow we will refer the patient to the emergency room for evaluation.    Plan: Immunoglobulins A G and M, Kappa and lambda  light chain, CBC with platelets differential,    Comprehensive metabolic panel    The patient is ready to learn, no apparent learning barriers were identified.  Diagnosis and treatment plans were explained to the patient. The patient expressed understanding of the content. The patient asked appropriate questions. The patient questions were answered to his satisfaction.    Chart documentation with Dragon Voice  recognition Software. Although reviewed after completion, some words and grammatical errors may remain.

## 2017-01-11 NOTE — ED AVS SNAPSHOT
[facility] Emergency Department    [address] [address]    Phone:  [phone]    Fax:  [phone]                                       Keven Jasso   MRN: [id number]    Department:  [facility] Emergency Department   Date of Visit:  1/11/2017           Patient Information     Date Of Birth          4/18/1933        Your diagnoses for this visit were:     Skin tear of elbow without complication, right, initial encounter        You were seen by Janiya Durand PA-C.      Follow-up Information     Follow up with Chidi Jacobson MD.    Specialty:  Family Practice    Why:  As needed, If symptoms worsen    Contact information:    [facility]  [address] [facility] [address] [phone]          Discharge Instructions         Old Laceration: Not Sutured  A laceration is a cut through the skin. This will usually require stitches if it is deep. However, if a laceration remains open for too long, the risk of infection increases. In your case, too much time has passed before coming for treatment. The danger of infection from suturing at this time is too high. That is why your wound was not sutured.  If the wound is spread open, it will heal by filling in from the bottom and sides. A wound that is not sutured may take 1 to 4 weeks to heal, depending on the size of the opening. A visible scar will probably occur. You can discuss revision of the scar with your healthcare provider at a later time.  Home care  The following guidelines will help you care for your laceration at home:    Keep the wound clean and dry. If a bandage was applied and it becomes wet or dirty, replace it. Otherwise, leave it in place for the first 24 hours, then change it once a day or as directed.    Clean the wound daily:    After removing any bandage, wash the area with soap and water. Use a wet cotton swab to loosen and remove any blood or crust that forms.    Talk with your doctor  before applying any antibiotic ointment to the wound. Reapply a fresh bandage.    You may remove the bandage to shower as usual after the first 24 hours, but do not soak the area in water (no tub baths or swimming) for the next five days. Avoid activities that may reinjure your wound.    The healthcare provider may prescribe an antibiotic cream or ointment to prevent infection. Do not stop using this medication until you have finished the prescribed course or the provider tells you to stop.    The healthcare provider may also prescribe medications for pain. Follow instructions for taking these medications.    Check the wound daily for signs of infection listed below.  Follow-up care  Follow up with your healthcare provider as advised.  When to seek medical advice  Call your healthcare provider right away if any of these occur:    Wound bleeding not controlled by direct pressure    Signs of infection, including increasing pain in the wound, increasing wound redness or swelling, or pus or bad odor coming from the wound    Fever of 100.4 F (38. C) or higher or as directed by your healthcare provider    Wound edges re-open    Wound changes colors    Numbness around the wound     Decreased movement around the injured area    4613-7557 [employer]. [address] [place] [address]. All rights reserved. This information is not intended as a substitute for professional medical care. Always follow your healthcare professional's instructions.          Future Appointments        Provider Department Dept Phone Center    1/18/2017 12:30 PM [facility] Lab [facility] Lab [phone] [facility]    1/18/2017 1:15 PM Go Mesa MD [facility] [phone] [facility]    1/18/2017 1:30 PM [place] chemo therapy [facility] [phone] [facility]    1/19/2017 1:30 PM [place] chemo therapy [facility] [phone] [facility]      24 Hour Appointment Hotline        To make an appointment at any [facility], call [phone] ([phone]). If you don't have a family doctor or clinic, we will help you find one. [facility] clinics are conveniently located to serve the needs of you and your family.             Review of your medicines      Our records show that you are taking the medicines listed below. If these are incorrect, please call your family doctor or clinic.        Dose / Directions Last dose taken    ferrous sulfate 325 (65 FE) MG tablet   Commonly known as:  IRON   Dose:  1 tablet   Quantity:  60 tablet        Take 1 tablet (325 mg) by mouth 2 times daily   Refills:  11        furosemide 20 MG tablet   Commonly known as:  LASIX   Dose:  20 mg   Quantity:  30 tablet        Take 1 tablet (20 mg) by mouth daily   Refills:  3        LISINOPRIL PO   Dose:  2.5 mg        Take 2.5 mg by mouth daily   Refills:  0        metoprolol 25 MG tablet   Commonly known as:  LOPRESSOR   Dose:  25 mg   Quantity:  180 tablet        Take 1 tablet (25 mg) by mouth 2 times daily   Refills:  3        multivitamin, therapeutic with minerals Tabs tablet   Dose:  1 tablet   Quantity:  30 each        Take 1 tablet by mouth daily   Refills:  0        nepafenac 0.1 % ophthalmic susp   Commonly known as:  NEVANAC   Dose:  1 drop   Quantity:  1 Bottle        Place 1 drop Into the left eye 2 times daily   Refills:  3        omeprazole 20 MG CR capsule   Commonly known as:  priLOSEC   Dose:  20 mg   Quantity:  30 capsule        Take 1 capsule (20 mg) by mouth daily   Refills:  3        potassium chloride SA 20 MEQ CR tablet   Commonly known as:  K-DUR/KLOR-CON M   Dose:  20 mEq   Quantity:  30 tablet        Take 1 tablet (20 mEq) by mouth daily   Refills:  0                Orders Needing Specimen Collection     None      Pending Results     Date and Time Order Name Status Description    1/11/2017 1258 [place] and lambda light chain In process     1/11/2017 1258 Immunoglobulins A G and M In  "process     2017 1258 Protein electrophoresis In process             Pending Culture Results     No orders found from 1/10/2017 to 2017.             Test Results from your hospital stay            Thank you for choosing [facility]       Thank you for choosing [facility] for your care. Our goal is always to provide you with excellent care. Hearing back from our patients is one way we can continue to improve our services. Please take a few minutes to complete the written survey that you may receive in the mail after you visit with us. Thank you!        [employer] Information     Myrl lets you send messages to your doctor, view your test results, renew your prescriptions, schedule appointments and more. To sign up, go to [email or web address] . Click on \"Log in\" on the left side of the screen, which will take you to the Welcome page. Then click on \"Sign up Now\" on the right side of the page.     You will be asked to enter the access code listed below, as well as some personal information. Please follow the directions to create your username and password.     Your access code is: [email or web address]  Expires: 2017 11:18 AM     Your access code will  in 90 days. If you need help or a new code, please call your [facility] clinic or [phone].        Care EveryWhere ID     This is your Care EveryWhere ID. This could be used by other organizations to access your [facility] medical records  [id number]        After Visit Summary       This is your record. Keep this with you and show to your community pharmacist(s) and doctor(s) at your next visit.                  "

## 2017-01-11 NOTE — ED NOTES
Has lg skin tear on left elbow from a wk ago, bumped it during the noc and it started bleeding again. Was her for infusion therapy and sent down to have his arm cleaned up and redressed

## 2017-01-11 NOTE — MR AVS SNAPSHOT
After Visit Summary   1/11/2017    Keven Jasso    MRN: [id number]           Patient Information     Date Of Birth          4/18/1933        Visit Information        Provider Department      1/11/2017 2:00 PM Go Mesa MD [facility] ONCOLOGY      Today's Diagnoses     Metastatic multiple myeloma to bone (H)    -  1     Bone metastases (H)         Multiple myeloma in remission (H)           Care Instructions    We would like to see you back in clinic with Dr. Mesa in 1 week.  We are sending you to the ER for Xray, wound dressing, and admittance until you can be placed in a nursing home.  Your prescription has been sent to:   [facility], [place] - [address] [address]  Phone: [phone] Fax: [phone]   When you are in need of a refill, please call your pharmacy and they will send us a request.  Copy of appointments, and after visit summary (AVS) given to patient.  If you have any questions during business hours (M-F 8 AM- 4PM), please call Olga Jiménez RN, BSN, OCN Oncology Hematology /Breast Cancer Navigator at [facility] [phone].   For questions after business hours, or on holidays/weekends, please call our after hours Nurse Triage line [phone]. Thank you.          Follow-ups after your visit        Follow-up notes from your care team     Return in about 1 week (around 1/18/2017).      Your next 10 appointments already scheduled     Jan 18, 2017 12:30 PM   LAB with [facility] ([facility])    [address] [address] [phone]           Patient must bring picture ID.  Patient should be prepared to give a urine specimen  Please do not eat 10-12 hours before your appointment if you are coming in fasting for labs on lipids, cholesterol, or glucose (sugar).  Pregnant women should follow their  Care Team instructions. Water with medications is okay. Do not drink coffee or other fluids.   If you have concerns about taking  your medications, please ask at office or if scheduling via [employer], send a message by clicking on Secure Messaging, Message Your Care Team.            Jan 18, 2017  1:15 PM   Return Visit with Go Mesa MD   [facility] ([facility])    [facility]  [address]  [place] [address]   [phone]            Jan 18, 2017  1:30 PM   Level O with ROOM 3 [facility] ([facility])    [facility]  [address]  [place] [address] [phone]            Jan 19, 2017  1:30 PM   Level O with ROOM 5 [facility] ([facility])    [facility]  [address]  [facility] [address]   [phone]              Future tests that were ordered for you today     Open Future Orders        Priority Expected Expires Ordered    Immunoglobulins A G and M Routine 1/11/2017 9/11/2017 1/11/2017    [place] and lambda light chain Routine 1/11/2017 9/11/2017 1/11/2017    CBC with platelets differential Routine 1/11/2017 9/11/2017 1/11/2017    Comprehensive metabolic panel Routine 1/11/2017 9/11/2017 1/11/2017            Who to contact     If you have questions or need follow up information about today's clinic visit or your schedule please contact [facility] directly at [phone].  Normal or non-critical lab and imaging results will be communicated to you by [employer], letter or phone within 4 business days after the clinic has received the results. If you do not hear from us within 7 days, please contact the clinic through [employer] or phone. If you have a critical or abnormal lab result, we will notify you by phone as soon as possible.  Submit refill requests through [employer] or call your pharmacy and they will  "forward the refill request to us. Please allow 3 business days for your refill to be completed.          Additional Information About Your Visit        [employer] Information     [employer] lets you send messages to your doctor, view your test results, renew your prescriptions, schedule appointments and more. To sign up, go to [email or web address] . Click on \"Log in\" on the left side of the screen, which will take you to the Welcome page. Then click on \"Sign up Now\" on the right side of the page.     You will be asked to enter the access code listed below, as well as some personal information. Please follow the directions to create your username and password.     Your access code is: [email or web address]  Expires: 2017 11:18 AM     Your access code will  in 90 days. If you need help or a new code, please call your [facility] clinic or [phone].        Care EveryWhere ID     This is your [facility] EveryWhere ID. This could be used by other organizations to access your [facility] medical records  [id number]        Your Vitals Were     Pulse Temperature Respirations Height BMI (Body Mass Index) Pulse Oximetry    100 96.8  F (36  C) (Tympanic) 18 1.676 m (5' 5.98\") 24.95 kg/m2 98%       Blood Pressure from Last 3 Encounters:   17 113/61   16 146/68   16 120/61    Weight from Last 3 Encounters:   17 70.081 kg (154 lb 8 oz)   16 73.664 kg (162 lb 6.4 oz)   16 72.167 kg (159 lb 1.6 oz)               Primary Care Provider Office Phone # Fax #    Chidi Jacobson [phone]       [facility] [address] [place] [address]        Thank you!     Thank you for choosing [facility]  for your care. Our goal is always to provide you with excellent care. Hearing back from our patients is one way we can continue to improve our services. Please take a few minutes to complete the written survey that you may receive in the mail after your visit with us. " Thank you!             Your Updated Medication List - Protect others around you: Learn how to safely use, store and throw away your medicines at www.disposemymeds.org.          This list is accurate as of: 1/11/17  2:09 PM.  Always use your most recent med list.                   Brand Name Dispense Instructions for use    ferrous sulfate 325 (65 FE) MG tablet    IRON    60 tablet    Take 1 tablet (325 mg) by mouth 2 times daily       furosemide 20 MG tablet    LASIX    30 tablet    Take 1 tablet (20 mg) by mouth daily       LISINOPRIL PO      Take 2.5 mg by mouth daily       metoprolol 25 MG tablet    LOPRESSOR    180 tablet    Take 1 tablet (25 mg) by mouth 2 times daily       multivitamin, therapeutic with minerals Tabs tablet     30 each    Take 1 tablet by mouth daily       nepafenac 0.1 % ophthalmic susp    NEVANAC    1 Bottle    Place 1 drop Into the left eye 2 times daily       omeprazole 20 MG CR capsule    priLOSEC    30 capsule    Take 1 capsule (20 mg) by mouth daily       potassium chloride SA 20 MEQ CR tablet    K-DUR/KLOR-CON M    30 tablet    Take 1 tablet (20 mEq) by mouth daily

## 2017-01-12 NOTE — PROGRESS NOTES
Home care nurse, called to report that patient has stopped taking all of his medications and has been noncompliant with regimens.  She did also update the [facility] provider he sees, who states, that's ok. Will update Dr. Mesa with this information.

## 2017-01-13 NOTE — TELEPHONE ENCOUNTER
Contact from Valerio Ruiz [facility] 's Services Officer ([phone]). According to his records, Gilmar has a 20% service connection. To have nursing home placement (SNF) costs paid by [facility], he would need to have a 70% or higher service connection. Only options would be to contact the [facility] community to see if any beds are available there, or possibly at either the VA facilities in [place] or [place] (as there are usually openings and they might take a  with lower service connection). MSW relayed this information to Gilmar's son, Jose A. He does not believe that Gilmar would agree to move so far away to get VA paid SNF placement. He will update his dad of this. MSW educated on only other option of making application through [facility] for financial assistance to pay for SNF placement locally. MSW will contact Jose A early next week and offered to meet with Gilmar and Jose A while at the Wednesday oncology appointment.

## 2017-01-13 NOTE — TELEPHONE ENCOUNTER
"Telephone contact with son Jose A. He agrees that if Dr. Mesa recommends SNF placement, his Dad should go. Gilmar is \"not happy\" about SNF placement. Inquired whether Gilmar had followed up to start services through [facility] and Jose A said no. Only has [facility] benefits. [facility], if available. Voicemail left for Kaitlynn to return call to MSW.   "

## 2017-01-18 NOTE — PROGRESS NOTES
"Met initially with Jose A to get update on his conversation this past weekend with his Dad regarding SNF placement. Gilmar does not want to look at placement far away with the [facility] ([place], [place] or [place] Would only want placement locally but has no funds to pay privately. Jose A spoke with a community friend this weekend-  A local mortician- and got the name of various hospice agencies because Jose A believes that hospice might be helpful. MSW gave details about hospice and encouraged Jose A to sit in on his Dad's appointment with Dr. Meas today to see what is recommended. Following Dr. Mesa's appointment with Gilmar, MSW met with Gilmar and Jose A, along with Olga, Oncology RN who reviewed hospice referral. Gilmar does not think he needs hospice \"yet\". In conversation about his goals, Gilmar does want to be placed in a SNF when he has increased care needs and can't remain at home. MSW assisted Gilmar in completing a [place] application for Long Term Care costs today. Gilmar signed the completed application and MSW alerted Gilmar and Jose A to needed verifications. They plan to drop off the completed application at [facility] office on their way home today. MSW will update next visiting [facility] RN to today's appointment, decisions and hospice referral to continue providing coordinated care.   "

## 2017-01-18 NOTE — PATIENT INSTRUCTIONS
Dr. ROXANNA Mesa has referred you to our [facility] program. Our hospice team has been notified and will contact you to set up an in-home visit. To reach them please call [phone]. If you have any questions please call BLAYNE Espinoza, BSN- Oncology Care Coordinator  @ [phone]. Thank you.

## 2017-01-18 NOTE — PROGRESS NOTES
DATE OF VISIT: Jan 18, 2017    Keven Jasso is a 83 year old male is seen today for   Chief Complaint   Patient presents with     Oncology Clinic Visit     Recheck Multiple Myeloma after ED visit,  Labs & Chemo today   .       (C90.01) Multiple myeloma in remission (H)  (primary encounter diagnosis)  (D64.9) Anemia, unspecified type  Patient is here today for discussion of the management plan. I explained to the patient that his been through several lines of chemotherapy including Velcade, Revlimid, dexamethasone, Daratumumab, pomalidomide, and more recently Carfilzomab. Over the last few weeks he has been deteriorating and his performance status gradually worsening.  He shows evidence of disease progression with increase in IgA level and M-protein level. I reviewed with him management of his symptoms through the palliative care program. We reviewed other treatment lines and toxicities related to treatment protocols. At the end of the long discussion we decided to proceed with palliative approach and arrange for hospice referral.     The patient is ready to learn, no apparent learning barriers were identified.  Diagnosis and treatment plans were explained to the patient. The patient expressed understanding of the content. The patient asked appropriate questions. The patient questions were answered to his satisfaction.  Time spent 25 minutes more than 50% of the time in counseling and coordination of care. Long discussion about management of myeloma, and prognosis.We also talked about palliative care and hospice management.  Chart documentation with Dragon Voice recognition Software. Although reviewed after completion, some words and grammatical errors may remain.

## 2017-01-18 NOTE — NURSING NOTE
"Keven Jasso is a 83 year old male who presents for:  Chief Complaint   Patient presents with     Oncology Clinic Visit     Recheck Multiple Myeloma after ED visit,  Labs & Chemo today        Initial Vitals:  /66 mmHg  Pulse 96  Temp(Src) 97.7  F (36.5  C) (Tympanic)  Resp 16  Ht 1.676 m (5' 5.98\")  Wt 70.081 kg (154 lb 8 oz)  BMI 24.95 kg/m2  SpO2 97% Estimated body mass index is 24.95 kg/(m^2) as calculated from the following:    Height as of this encounter: 1.676 m (5' 5.98\").    Weight as of this encounter: 70.081 kg (154 lb 8 oz).. Body surface area is 1.81 meters squared. BP completed using cuff size: regular  No Pain (0) No LMP for male patient. Allergies and medications reviewed.     Medications: Medication refills not needed today.  Pharmacy name entered into [employer]: [facility] [place], [place] - [address] [place] [address]    Comments: Recheck Multiple Myeloma after ED visit,  Labs & Chemo today.     10  minutes for nursing intake (face to face time)   Deepali Webber CMA          "

## 2017-01-18 NOTE — MR AVS SNAPSHOT
"              After Visit Summary   1/18/2017    Keven Jasso    MRN: [id number]           Patient Information     Date Of Birth          4/18/1933        Visit Information        Provider Department      1/18/2017 1:15 PM Go Mesa MD [facility] ONCOLOGY      Care Instructions    Dr. ROXANNA Mesa has referred you to our [facility] program. Our hospice team has been notified and will contact you to set up an in-home visit. To reach them please call [phone]. If you have any questions please call BLAYNE Espinoza, BSN- Oncology Care Coordinator  @ [phone]. Thank you.          Follow-ups after your visit        Your next 10 appointments already scheduled     Jan 19, 2017  1:30 PM   Level O with ROOM 5 [facility])    [facility]  [address]  [facility] [facility] [address] [phone]              Who to contact     If you have questions or need follow up information about today's clinic visit or your schedule please contact [facility] directly at [phone].  Normal or non-critical lab and imaging results will be communicated to you by MyChart, letter or phone within 4 business days after the clinic has received the results. If you do not hear from us within 7 days, please contact the clinic through Alint[employer] or phone. If you have a critical or abnormal lab result, we will notify you by phone as soon as possible.  Submit refill requests through Locish or call your pharmacy and they will forward the refill request to us. Please allow 3 business days for your refill to be completed.          Additional Information About Your Visit        MyChart Information     Locish lets you send messages to your doctor, view your test results, renew your prescriptions, schedule appointments and more. To sign up, go to [email or web address] . Click on \"Log in\" on the left side of the screen, which will take you to " "the Welcome page. Then click on \"Sign up Now\" on the right side of the page.     You will be asked to enter the access code listed below, as well as some personal information. Please follow the directions to create your username and password.     Your access code is: [email or web address]  Expires: 2017 11:18 AM     Your access code will  in 90 days. If you need help or a new code, please call your [facility] or [phone].        Care EveryWhere ID     This is your Care EveryWhere ID. This could be used by other organizations to access your [place] medical records  [id number]        Your Vitals Were     Pulse Temperature Respirations Height BMI (Body Mass Index) Pulse Oximetry    96 97.7  F (36.5  C) (Tympanic) 16 1.676 m (5' 5.98\") 24.95 kg/m2 97%       Blood Pressure from Last 3 Encounters:   17 115/66   17 113/67   17 113/61    Weight from Last 3 Encounters:   17 70.081 kg (154 lb 8 oz)   17 70.081 kg (154 lb 8 oz)   16 73.664 kg (162 lb 6.4 oz)              Today, you had the following     No orders found for display       Primary Care Provider Office Phone # Fax #    Chidi Jacobson [phone]       [facility] [address] [facility] [address]        Thank you!     Thank you for choosing [facility]  for your care. Our goal is always to provide you with excellent care. Hearing back from our patients is one way we can continue to improve our services. Please take a few minutes to complete the written survey that you may receive in the mail after your visit with us. Thank you!             Your Updated Medication List - Protect others around you: Learn how to safely use, store and throw away your medicines at www.disposemymeds.org.          This list is accurate as of: 17  1:34 PM.  Always use your most recent med list.                   Brand Name Dispense Instructions for use    ferrous sulfate 325 (65 FE) MG " tablet    IRON    60 tablet    Take 1 tablet (325 mg) by mouth 2 times daily       furosemide 20 MG tablet    LASIX    30 tablet    Take 1 tablet (20 mg) by mouth daily       LISINOPRIL PO      Take 2.5 mg by mouth daily       metoprolol 25 MG tablet    LOPRESSOR    180 tablet    Take 1 tablet (25 mg) by mouth 2 times daily       multivitamin, therapeutic with minerals Tabs tablet     30 each    Take 1 tablet by mouth daily       nepafenac 0.1 % ophthalmic susp    NEVANAC    1 Bottle    Place 1 drop Into the left eye 2 times daily       omeprazole 20 MG CR capsule    priLOSEC    30 capsule    Take 1 capsule (20 mg) by mouth daily       potassium chloride SA 20 MEQ CR tablet    K-DUR/KLOR-CON M    30 tablet    Take 1 tablet (20 mEq) by mouth daily

## 2017-01-30 NOTE — ED PROVIDER NOTES
History     Chief Complaint   Patient presents with     Social Work Services     HPI  Mr. Keven Jasso is a 83 year old male with history of hyperlipidemia, hypertension, persistent atrial fibrillation, metastatic multiple myeloma to bone, chemotherapy-induced neutropenia who presents to the ED via EMS today for evaluation of social work complication. The patient was recieving a welfare check by the [facility] this afternoon at which time he was found in bed, soiled, with breakfast on his table. They felt the state in which he was found warranted evaluation in the ED for possible bed sores. The patient presents alert and unsure as to why he was sent to the ED. He clarifies that he stayed up until 0300 last night watching TV and cites that as the reason he was found sleeping in the afternoon. He appears oriented and talkative, feels as though his care is well managed by his family whom he lives with. His granddaughter is presents at bedside and agrees that the patient is well managed/cared for by his family. Of note, the patient is seen at [facility] for his metastatic myeloma but has recently discontinued both chemo and radiation therapy. The patient denies recent illness or infection.     I have reviewed the Medications, Allergies, Past Medical and Surgical History, and Social History in the Epic system.    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     Backache     HYPERLIPIDEMIA LDL GOAL <130     HTN (hypertension)     Anemia     Multiple myeloma (H)     Bone metastases (H)     Wheezing     GERD (gastroesophageal reflux disease)     Metastatic multiple myeloma to bone (H)     Senile nuclear sclerosis     Elevated liver enzymes     Persistent atrial fibrillation (H)     Paroxysmal atrial fibrillation (H)     Fatigue     Failure to thrive in adult     Abdominal pain, left lower quadrant     Colonic diverticular  abscess     Long term current use of anticoagulant therapy     Chemotherapy-induced neutropenia (H)     Advance care planning     Current Outpatient Prescriptions   Medication Sig Dispense Refill     ferrous sulfate (IRON) 325 (65 FE) MG tablet Take 1 tablet (325 mg) by mouth 2 times daily 60 tablet 11     potassium chloride SA (K-DUR/KLOR-CON M) 20 MEQ CR tablet Take 1 tablet (20 mEq) by mouth daily 30 tablet 0     omeprazole (PRILOSEC) 20 MG capsule Take 1 capsule (20 mg) by mouth daily 30 capsule 3     furosemide (LASIX) 20 MG tablet Take 1 tablet (20 mg) by mouth daily 30 tablet 3     metoprolol (LOPRESSOR) 25 MG tablet Take 1 tablet (25 mg) by mouth 2 times daily 180 tablet 3     multivitamin, therapeutic with minerals (THERA-VIT-M) TABS Take 1 tablet by mouth daily 30 each 0     LISINOPRIL PO Take 2.5 mg by mouth daily        nepafenac (NEVANAC) 0.1 % ophthalmic suspension Place 1 drop Into the left eye 2 times daily 1 Bottle 3     No Known Allergies    Review of Systems   Constitutional: Negative for fever, chills, diaphoresis and fatigue.   HENT: Negative for congestion and sore throat.    Eyes:        Decreased vision in his R eye.   Respiratory: Positive for cough. Negative for chest tightness, shortness of breath, wheezing and stridor.    Cardiovascular: Negative for chest pain, palpitations and leg swelling.   Gastrointestinal: Positive for diarrhea. Negative for nausea, vomiting, abdominal pain, constipation and blood in stool.   Genitourinary: Negative for dysuria, frequency and decreased urine volume.   Musculoskeletal: Positive for back pain. Negative for neck pain.        Chronic back pain   Skin: Negative for rash.   Neurological: Positive for weakness. Negative for dizziness, seizures, speech difficulty, light-headedness, numbness and headaches.   Hematological: Bruises/bleeds easily.   Psychiatric/Behavioral: Negative for confusion.       Physical Exam   BP: 145/83 mmHg  Pulse: 106  Heart Rate:  106  Temp: 98.4  F (36.9  C)  Resp: 16  Weight: 70 kg (154 lb 5.2 oz)  SpO2: 95 %  Physical Exam   Constitutional: No distress.   dishelved , poor hygiene.    Genitourinary: Guaiac positive stool.   Redness but no breakdown around the ischial tuberosities and rectum.  No bed sores are noted.  Normal tone and enlarged prostate with no masses.  Guaiac positive stool brown in color   Psychiatric: He has a normal mood and affect.   Nursing note and vitals reviewed.    HENT: Oral mucosa moist, no lesions. Posterior pharynx without significant erythema or edema.   Eye: Right eye decrease vision  Neck: Neck supple  Cardiovascular: Heart with regular rate and rhythm, no murmur.   Pulmonary/Chest: Lungs clear slightly decreased at bases, no rhonchi or wheezing heard.   Abdominal: Abdomen is soft, non-distended, and non-tender.   Musculoskeletal: Moving all extremities well. No peripheral edema.  Neurological: Alert. Cranial nerves in tact. No focal neurological deficit.   ED Course   Procedures             Critical Care time:  none               Labs Ordered and Resulted from Time of ED Arrival Up to the Time of Departure from the ED   CBC WITH PLATELETS DIFFERENTIAL - Abnormal; Notable for the following:     WBC 3.5 (*)     RBC Count 1.98 (*)     Hemoglobin 6.7 (*)     Hematocrit 20.6 (*)      (*)     MCH 33.8 (*)     RDW 19.6 (*)     Platelet Count 93 (*)     All other components within normal limits   COMPREHENSIVE METABOLIC PANEL - Abnormal; Notable for the following:     Creatinine 1.27 (*)     GFR Estimate 54 (*)     Albumin 1.3 (*)     Protein Total 11.7 (*)     All other components within normal limits   INR   PARTIAL THROMBOPLASTIN TIME   URINE MACROSCOPIC WITH REFLEX TO MICRO   ABO/RH TYPE AND SCREEN     Results for orders placed or performed during the hospital encounter of 01/30/17 (from the past 24 hour(s))   CBC with platelets, differential   Result Value Ref Range    WBC 3.5 (L) 4.0 - 11.0 10e9/L    RBC  Count 1.98 (L) 4.4 - 5.9 10e12/L    Hemoglobin 6.7 (LL) 13.3 - 17.7 g/dL    Hematocrit 20.6 (L) 40.0 - 53.0 %     (H) 78 - 100 fl    MCH 33.8 (H) 26.5 - 33.0 pg    MCHC 32.5 31.5 - 36.5 g/dL    RDW 19.6 (H) 10.0 - 15.0 %    Platelet Count 93 (L) 150 - 450 10e9/L    Diff Method Pending    Comprehensive metabolic panel   Result Value Ref Range    Sodium 140 133 - 144 mmol/L    Potassium 4.3 3.4 - 5.3 mmol/L    Chloride 107 94 - 109 mmol/L    Carbon Dioxide 23 20 - 32 mmol/L    Anion Gap 10 3 - 14 mmol/L    Glucose 87 70 - 99 mg/dL    Urea Nitrogen 28 7 - 30 mg/dL    Creatinine 1.27 (H) 0.66 - 1.25 mg/dL    GFR Estimate 54 (L) >60 mL/min/1.7m2    GFR Estimate If Black 65 >60 mL/min/1.7m2    Calcium 8.6 8.5 - 10.1 mg/dL    Bilirubin Total 0.2 0.2 - 1.3 mg/dL    Albumin 1.3 (L) 3.4 - 5.0 g/dL    Protein Total 11.7 (H) 6.8 - 8.8 g/dL    Alkaline Phosphatase 70 40 - 150 U/L    ALT 7 0 - 70 U/L    AST 15 0 - 45 U/L   ABO/Rh type and screen   Result Value Ref Range    ABO Pending     RH(D) Pending     Antibody Screen Pending     Test Valid Only At Pending     Specimen Expires Pending      Results for orders placed or performed during the hospital encounter of 01/30/17   XR Chest 2 Views    Narrative    CHEST TWO VIEWS  1/30/2017 7:00 PM     COMPARISON: Two view chest x-ray 6/29/2016.    HISTORY: Weakness.      Impression    IMPRESSION: There is a new small left pleural effusion. There are new  patchy well-circumscribed opacities over the left upper lung, right  mid lung and right lower lung that may represent areas of loculated  pleural fluid. Pulmonary masses cannot be completely excluded. The  lungs are otherwise clear. Heart size is enlarged but there is no  evidence for congestive failure.     ELOINA CARRASQUILLO MD         3:28 PM Patient Assessed    Assessments & Plan (with Medical Decision Making) labs were obtained. Discussed case with  who had discussed findings with  who visited the  patient. Very poor living environment with patient covered in feces and urine. Felt patient was a vulnerable adult who should be admitted for further evaluation and placement. Patient's white countel is lower at 3.5 and his hgb is slighlty decreased at 6.7. Platelet count is low but similar to previous. Comprehensive metabolic panel with creatinine of 1.27 with is near baseline. INR and PTT are elevated. Due to positive blood in stool patient was typed and crossed. I am going to give the patient one unit of blood. Discussed case with Dr. Reyes Hospitalist who is willing to admit the patient for further evaluation and care. Patient is in agreement with this plan. Dr. Reyes added a CXR which revealed a small L pleural effusion with pathcy well circumscribed opacities which could represent areas of loculated pleural fluid or pulmonary masses. I discussed the case with the patient and his grand-daughter and the patient numerous times throughout the patient's stay and they are in agreement with the plan.      I have reviewed the nursing notes.    I have reviewed the findings, diagnosis, plan and need for follow up with the patient.    Current Discharge Medication List          Final diagnoses:   Anemia   Metastatic multiple myeloma to bone (H)   Failure to thrive in adult     This document serves as a record of the services and decisions personally performed and made by Dagoberto Betancur MD. It was created on HIS/HER behalf by Kendrick Barrett, a trained medical scribe. The creation of this document is based the provider's statements to the medical scribe.  Kendrick Barrett 3:27 PM 1/30/2017    Provider:   The information in this document, created by the medical scribe for me, accurately reflects the services I personally performed and the decisions made by me. I have reviewed and approved this document for accuracy prior to leaving the patient care area.  Dagoberto Betancur MD 3:27 PM 1/30/2017 1/30/2017    [facility] EMERGENCY DEPARTMENT      Dagoberto Betancur MD  01/31/17 [id number]
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
## 2017-01-30 NOTE — IP AVS SNAPSHOT
MRN:[id number]                      After Visit Summary   1/30/2017    Keven Jasso    MRN: [id number]           Thank you!     Thank you for choosing [facility] for your care. Our goal is always to provide you with excellent care. Hearing back from our patients is one way we can continue to improve our services. Please take a few minutes to complete the written survey that you may receive in the mail after you visit with us. Thank you!        Patient Information     Date Of Birth          4/18/1933        About your hospital stay     You were admitted on:  January 30, 2017 You last received care in the:  [facility]    You were discharged on:  February 2, 2017       Who to Call     For medical emergencies, please call 911.  For non-urgent questions about your medical care, please call your primary care provider or clinic, [phone]          Attending Provider     Provider    Dagoberto Betancur MD Dummer, MD Kary Leonard Shams, MD       Primary Care Provider Office Phone # Fax #    Chidi Jacobson [phone]       [facility] [address] [facility] [address]        After Care Instructions     Activity - Up with nursing assistance           Advance Diet as Tolerated       Follow this diet upon discharge: Orders Placed This Encounter  Advance Diet as Tolerated: Regular Diet Adult; Mechanical/Dental Soft Diet            Fall precautions           General info for SNF       Length of Stay Estimate: long Term Care  Condition at Discharge: Stable  Level of care:skilled   Rehabilitation Potential: Fair  Admission H&P remains valid and up-to-date: Yes  Recent Chemotherapy: N/A  Use Nursing Home Standing Orders: Yes            Mantoux instructions       Give two-step Mantoux (PPD) Per Facility Policy Yes                  Follow-up Appointments     Follow Up and recommended labs and tests       Hospice f/u                   Additional Services     Home Care Referral       Your provider has referred you to: [facility]: [facility] [phone]   [email or web address]    Extended Emergency Contact Information  Primary Emergency Contact: Shyann Zambrano   [facility]  Home Phone: [phone]  Mobile Phone: [phone]  Relation: Daughter  Secondary Emergency Contact: Jose A Jasso  Address: [address] [address] [facility]  Home Phone: [phone]  Mobile Phone: [phone]  Relation: Son    Patient Anticipated Discharge Date: 2/2/17   RN, PT, HHA to begin 24 - 48 hours after discharge.  PLEASE EVALUATE AND TREAT (Evaluation timeline is 24 - 48 hrs. Please call if there is need for a variance to this timeline).    REASON FOR REFERRAL: Hospice - Diagnosis:  Metastatic multiple myeloma to bone    ADDITIONAL SERVICES NEEDED: hospice    OTHER PERTINENT INFORMATION: Patient was last seen by provider on 2/1/17 for   Failure to thrive in adult Metastatic multiple myeloma to bone.    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     Backache     HYPERLIPIDEMIA LDL GOAL <130     HTN (hypertension)     Anemia     Multiple myeloma (H)     Bone metastases (H)     Wheezing     GERD (gastroesophageal reflux disease)     Metastatic multiple myeloma to bone  (H)     Senile nuclear sclerosis     Elevated liver enzymes     Persistent atrial fibrillation (H)     Paroxysmal atrial fibrillation (H)     Fatigue     Failure to thrive in adult     Abdominal pain, left lower quadrant     Colonic diverticular abscess     Long term current use of anticoagulant therapy     Chemotherapy-induced neutropenia (H)     Advance care planning      Documentation of Face to Face and Certification for Home Health Services    I certify that patient, Keven Jasso is under my care and that I, or a Nurse Practitioner or Physician's Assistant working with me, had a face-to-face encounter that meets the physician face-to-face encounter requirements with this patient on: 2/1/2017.    This encounter with the patient was in whole, or in part, for the following medical condition, which is the primary reason for Home Health Care:  Metastatic multiple myeloma to bone .    I certify that, based on my findings, the following services are medically necessary Home Health Services: Nursing    My clinical findings support the need for the above services because: Nurse is needed: To provide assessment and oversight required in the home to assure adherence to the medical plan due to: hospice,  Metastatic multiple myeloma to bone ..    Further, I certify that my clinical findings support that this patient is homebound (i.e. absences from home require considerable and taxing effort and are for medical reasons or [employer] services or infrequently or of short duration when for other reasons) because: Requires assistance of another person or specialized equipment to access medical services because patient: Range of motion limitations prevents ability to exit home safely..    Based on the above findings, I certify that this patient is confined to the home and needs intermittent skilled nursing care, physical therapy and/or speech therapy.  The patient is under my care, and I have initiated the establishment of  the plan of care.  This patient will be followed by a physician who will periodically review the plan of care.    Physician/Provider to provide follow up care: Chidi Jacobson.    [facility] at time of discharge: Izzy Preston MD    Please be aware that coverage of these services is subject to the terms and limitations of your health insurance plan.  Call member services at your health plan with any benefit or coverage questions.                  Further instructions from your care team       From Coy Hawthorne, Palliative Care NP, Cell [phone]        End stage multiple myeloma    [facility], [phone] on call 24/7    Morphine (roxanol) for cough, breathlessness, pain    Note that INR has been rising despite no blood thinners; I advise no ASA or NSAIDs be given    OK to place on O2 PRN for comfort.  Use 1-6 lpm as needed for comfort.      Anemic on discharge; no transfusions.  Hgb dropping fairly rapidly in spite of transfusion on admission; platelets also falling pretty rapidly.  Creatinine also trending up.      Weak, fall risk.    10-lb weight loss in 3 months (6%); albumin falling pretty rapidly.  Expected due to end stage disease.     Prognosis: days to weeks    Patient pleasant but confused and lacks decisional capacity.  DAughter Shyann is primary decision-maker; her phone number is noted on the POLST form.     Stated he is Voodoo and Quaker but didn't say anything approvingly of either Synagogue.  Ask Shyann if she thinks he would want any sacraments or special blessings prior to death.      Thank you.     ++++++++++++++++++++++++++++++++++++++++++++++++++++++++++++++++++++++++++  From wound care -   Has a small stage II pressure ulcer on coccyx, this is only 2x3mm. Recommend TID, (and PRN following incontinence cares,) cleansing and barrier cream application.  Assist with turning every 2 hours as needed and maintain HOB elevation <30 degrees as able, use chair  "cushion when up and limiting sitting to 1 hour at a time as able.  Needs regular incontinence cares including barrier cream use.    Pending Results     No orders found from 2017 to 2017.            Statement of Approval     Ordered          17 0946  I have reviewed and agree with all the recommendations and orders detailed in this document.   EFFECTIVE NOW     Approved and electronically signed by:  Izzy Preston MD           17 1040  I have reviewed and agree with all the recommendations and orders detailed in this document.   EFFECTIVE NOW     Approved and electronically signed by:  Izzy Preston MD             Admission Information        Provider Department Dept Phone    2017 Izzy Preston MD [facility] [phone]      Your Vitals Were     Blood Pressure Pulse Temperature    102/68 mmHg 78 97.7  F (36.5  C) (Oral)    Respirations Height Weight    18 1.778 m (5' 10\") 70.2 kg (154 lb 12.2 oz)    BMI (Body Mass Index) Pulse Oximetry       22.21 kg/m2 95%       MyChart Information     [facility] lets you send messages to your doctor, view your test results, renew your prescriptions, schedule appointments and more. To sign up, go to [email or web address] . Click on \"Log in\" on the left side of the screen, which will take you to the Welcome page. Then click on \"Sign up Now\" on the right side of the page.     You will be asked to enter the access code listed below, as well as some personal information. Please follow the directions to create your username and password.     Your access code is: [email or web address]  Expires: 2017 11:29 AM     Your access code will  in 90 days. If you need help or a new code, please call your [facility] clinic or [phone].        Care EveryWhere ID     This is your Care EveryWhere ID. This could be used by other organizations to access your [facility] medical records  [id number]           Review of your medicines      START taking        Dose / " Directions    morphine sulfate HIGH CONCENTRATE 100 MG/5ML (HIGH CONC) solution   Commonly known as:  ROXANOL *CONCENTRATED*   Used for:  Dyspnea, unspecified type, Metastatic multiple myeloma to bone (H)        Dose:  5-10 mg   Take 0.25-0.5 mLs (5-10 mg) by mouth every 2 hours as needed for moderate to severe pain or shortness of breath / dyspnea   Quantity:  30 mL   Refills:  0       order for DME   Used for:  Dyspnea, unspecified type        Equipment being ordered: Oxygen PRN for comfort; may range flow between 1-6 lpm   Quantity:  1 each   Refills:  0       senna-docusate 8.6-50 MG per tablet   Commonly known as:  SENOKOT-S;PERICOLACE   Used for:  Metastatic multiple myeloma to bone (H)        Dose:  1-2 tablet   Take 1-2 tablets by mouth At Bedtime   Quantity:  100 tablet   Refills:  0         CONTINUE these medicines which have NOT CHANGED        Dose / Directions    metoprolol 25 MG tablet   Commonly known as:  LOPRESSOR   Used for:  Persistent atrial fibrillation (H)        Dose:  25 mg   Take 1 tablet (25 mg) by mouth 2 times daily   Quantity:  180 tablet   Refills:  3       nepafenac 0.1 % ophthalmic susp   Commonly known as:  NEVANAC   Used for:  Senile nuclear sclerosis, left        Dose:  1 drop   Place 1 drop Into the left eye 2 times daily   Quantity:  1 Bottle   Refills:  3       omeprazole 20 MG CR capsule   Commonly known as:  priLOSEC   Used for:  Bronchitis with bronchospasm        Dose:  20 mg   Take 1 capsule (20 mg) by mouth daily   Quantity:  30 capsule   Refills:  3         STOP taking     ferrous sulfate 325 (65 FE) MG tablet   Commonly known as:  IRON           furosemide 20 MG tablet   Commonly known as:  LASIX           LISINOPRIL PO           multivitamin, therapeutic with minerals Tabs tablet           potassium chloride SA 20 MEQ CR tablet   Commonly known as:  K-DUR/KLOR-CON M                Where to get your medicines      Some of these will need a paper prescription and others  can be bought over the counter. Ask your nurse if you have questions.     Bring a paper prescription for each of these medications    - morphine sulfate HIGH CONCENTRATE 100 MG/5ML (HIGH CONC) solution  - order for DME    You don't need a prescription for these medications    - senna-docusate 8.6-50 MG per tablet             Protect others around you: Learn how to safely use, store and throw away your medicines at www.disposemymeds.org.             Medication List: This is a list of all your medications and when to take them. Check marks below indicate your daily home schedule. Keep this list as a reference.      Medications           Morning Afternoon Evening Bedtime As Needed    metoprolol 25 MG tablet   Commonly known as:  LOPRESSOR   Take 1 tablet (25 mg) by mouth 2 times daily   Last time this was given:  25 mg on 2/2/2017  8:22 AM                                morphine sulfate HIGH CONCENTRATE 100 MG/5ML (HIGH CONC) solution   Commonly known as:  ROXANOL *CONCENTRATED*   Take 0.25-0.5 mLs (5-10 mg) by mouth every 2 hours as needed for moderate to severe pain or shortness of breath / dyspnea                                nepafenac 0.1 % ophthalmic susp   Commonly known as:  NEVANAC   Place 1 drop Into the left eye 2 times daily   Last time this was given:  1 drop on 2/2/2017  8:24 AM                                omeprazole 20 MG CR capsule   Commonly known as:  priLOSEC   Take 1 capsule (20 mg) by mouth daily   Last time this was given:  20 mg on 2/2/2017  8:23 AM                                order for DME   Equipment being ordered: Oxygen PRN for comfort; may range flow between 1-6 lpm                                senna-docusate 8.6-50 MG per tablet   Commonly known as:  SENOKOT-S;PERICOLACE   Take 1-2 tablets by mouth At Bedtime

## 2017-01-30 NOTE — IP AVS SNAPSHOT
` `     [facility] SUR[facility]: [phone]            Medication Administration Report for Keven Jasso as of 02/02/17 1001   Legend:    Given Hold Not Given Due Canceled Entry Other Actions    Time Time (Time) Time  Time-Action       Inactive    Active    Linked        Medications 01/27/17 01/28/17 01/29/17 01/30/17 01/31/17 02/01/17 02/02/17    acetaminophen (TYLENOL) tablet 650 mg  Dose: 650 mg Freq: EVERY 4 HOURS PRN Route: PO  PRN Reason: mild pain  Start: 01/30/17 2047   Admin Instructions: Alternate ibuprofen (if ordered) with acetaminophen.  Maximum acetaminophen dose from all sources = 75 mg/kg/day not to exceed 4 grams/day.               metoprolol (LOPRESSOR) tablet 25 mg  Dose: 25 mg Freq: 2 TIMES DAILY Route: PO  Start: 01/30/17 2100       2100 (25 mg)-Given        0805 (25 mg)-Given       2025 (25 mg)-Given        0834 (25 mg)-Given       1849 (25 mg)-Given               0822 (25 mg)-Given       [ ] 2000           morphine sulfate HIGH CONCENTRATE (ROXANOL *CONCENTRATED*) 100 MG/5ML (HIGH CONC) solution 5 mg  Dose: 5 mg Freq: EVERY 2 HOURS PRN Route: PO  PRN Reasons: moderate to severe pain,shortness of breath / dyspnea  Start: 02/01/17 1020   Admin Instructions: Caution: Solution is 10 time more concentrated than standard solution. Verify dose volume.               naloxone (NARCAN) injection 0.1-0.4 mg  Dose: 0.1-0.4 mg Freq: EVERY 2 MIN PRN Route: IV  PRN Reason: opioid reversal  Start: 01/30/17 2047   Admin Instructions: For respiratory rate LESS than or EQUAL to 8.  Partial reversal dose:  0.1 mg titrated q 2 minutes for Analgesia Side Effects Monitoring Sedation Level of 3 (frequently drowsy, arousable, drifts to sleep during conversation).Full reversal dose:  0.4 mg bolus for Analgesia Side Effects Monitoring Sedation Level of 4 (somnolent, minimal or no response to stimulation).               nepafenac (NEVANAC) 0.1 % ophthalmic susp 1 drop  Dose: 1 drop Freq: 2 TIMES DAILY  Route: LEFT EYE  Start: 01/30/17 2100   Admin Instructions: Shake well.         (0007)-Not Given       0806 (1 drop)-Given       2025 (1 drop)-Given        0834 (1 drop)-Given       1848 (1 drop)-Given               0824 (1 drop)-Given       [ ] 2000           omeprazole (priLOSEC) CR capsule 20 mg  Dose: 20 mg Freq: DAILY Route: PO  Start: 01/31/17 0800        0805 (20 mg)-Given        0834 (20 mg)-Given        0823 (20 mg)-Given           ondansetron (ZOFRAN-ODT) ODT tab 4 mg  Dose: 4 mg Freq: EVERY 6 HOURS PRN Route: PO  PRN Reason: nausea  Start: 01/30/17 2047   Admin Instructions: This is Step 1 of nausea and vomiting management.  If nausea not resolved in 15 minutes, go to Step 2 prochlorperazine (COMPAZINE). Do not push through foil backing. Peel back foil and gently remove. Place on tongue immediately. Administration with liquid unnecessary              Or  ondansetron (ZOFRAN) injection 4 mg  Dose: 4 mg Freq: EVERY 6 HOURS PRN Route: IV  PRN Reasons: nausea,vomiting  Start: 01/30/17 2047   Admin Instructions: This is Step 1 of nausea and vomiting management.  If nausea not resolved in 15 minutes, go to Step 2 prochlorperazine (COMPAZINE).               oxyCODONE (ROXICODONE) IR tablet 5-10 mg  Dose: 5-10 mg Freq: EVERY 3 HOURS PRN Route: PO  PRN Reason: moderate to severe pain  Start: 01/30/17 2047              polyethylene glycol (MIRALAX/GLYCOLAX) Packet 17 g  Dose: 17 g Freq: DAILY PRN Route: PO  PRN Reason: constipation  Start: 01/30/17 2047   Admin Instructions: Give in 8oz of  water, juice, or soda. Hold for loose stools.  This is the second step of a three step constipation treatment protocol.  1 Packet = 17 grams. Mixed prescribed dose in 8 ounces of water. Follow with 8 oz. of water.               potassium chloride SA (K-DUR/KLOR-CON M) CR tablet 20 mEq  Dose: 20 mEq Freq: DAILY Route: PO  Start: 01/31/17 0800   Admin Instructions: DO NOT CRUSH.         0805 (20 mEq)-Given        0834 (20  mEq)-Given        0823 (20 mEq)-Given           prochlorperazine (COMPAZINE) injection 5 mg  Dose: 5 mg Freq: EVERY 6 HOURS PRN Route: IV  PRN Reasons: nausea,vomiting  Start: 01/30/17 2047   Admin Instructions: This is Step 2 of nausea and vomiting management.   If nausea not resolved in 15 minutes, give metoclopramide (REGLAN) if ordered (step 3 of nausea and vomiting management)              Or  prochlorperazine (COMPAZINE) tablet 5 mg  Dose: 5 mg Freq: EVERY 6 HOURS PRN Route: PO  PRN Reason: vomiting  Start: 01/30/17 2047   Admin Instructions: This is Step 2 of nausea and vomiting management.   If nausea not resolved in 15 minutes, give metoclopramide (REGLAN) if ordered (step 3 of nausea and vomiting management)              Or  prochlorperazine (COMPAZINE) Suppository 12.5 mg  Dose: 12.5 mg Freq: EVERY 12 HOURS PRN Route: RE  PRN Reasons: nausea,vomiting  Start: 01/30/17 2047   Admin Instructions: This is Step 2 of nausea and vomiting management.   If nausea not resolved in 15 minutes, give metoclopramide (REGLAN) if ordered (step 3 of nausea and vomiting management)               senna-docusate (SENOKOT-S;PERICOLACE) 8.6-50 MG per tablet 1-2 tablet  Dose: 1-2 tablet Freq: AT BEDTIME Route: PO  Start: 02/01/17 2200         (2101)-Not Given        [ ] 2200           sodium chloride (PF) 0.9% PF flush 3 mL  Dose: 3 mL Freq: EVERY 8 HOURS Route: IK  Start: 01/31/17 0615        0603 (3 mL)-Given       1547 (3 mL)-Given        0015 (3 mL)-Given       0834 (3 mL)-Given       1732 (3 mL)-Given        0134 (3 mL)-Given       0826 (3 mL)-Given       [ ] 1600          Completed Medications  Medications 01/27/17 01/28/17 01/29/17 01/30/17 01/31/17 02/01/17 02/02/17         Dose: 5 mg Freq: ONCE Route: PO  Start: 01/30/17 1916   End: 01/30/17 2100       2100 (5 mg)-Given             Discontinued Medications  Medications 01/27/17 01/28/17 01/29/17 01/30/17 01/31/17 02/01/17 02/02/17         Dose: 2.5 mg Freq: DAILY Route:  PO  Start: 01/31/17 0800   End: 01/31/17 1330        0805 (2.5 mg)-Given       1330-Med Discontinued

## 2017-01-30 NOTE — PROGRESS NOTES
Care Management:  Received a luci from [facility] ([phone]) stating she visited patient in response to a [facility] report. She reports patient was found in bed soiled with urine and feces. Per report patient has had services through [facility] ([phone] Fax: [phone]). Patient has [facility] service benefits.   Sonja Pope [phone]

## 2017-01-30 NOTE — IP AVS SNAPSHOT
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   Musculoskeletal: Positive for back pain. Negative for neck pain.         Chronic back pain   Skin: Negative for rash.   Neurological: Positive for weakness. Negative for dizziness, seizures, speech difficulty, light-headedness, numbness and headaches.   Hematological: Bruises/bleeds easily.   Psychiatric/Behavioral: Negative for confusion.       Physical Exam   BP: 145/83 mmHg  Pulse: 106  Heart Rate: 106  Temp: 98.4  F (36.9  C)  Resp: 16  Weight: 70 kg (154 lb 5.2 oz)  SpO2: 95 %  Physical Exam   Constitutional: No distress.   dishelved , poor hygiene.    Genitourinary: Guaiac positive stool.   Redness but no breakdown around the ischial tuberosities and rectum.  No bed sores are noted.  Normal tone and enlarged prostate with no masses.  Guaiac positive stool brown in color   Psychiatric: He has a normal mood and affect.   Nursing note and vitals reviewed.    HENT: Oral mucosa moist, no lesions. Posterior pharynx without significant erythema or edema.   Eye: Right eye decrease vision  Neck: Neck supple  Cardiovascular: Heart with regular rate and rhythm, no murmur.   Pulmonary/Chest: Lungs clear slightly decreased at bases, no rhonchi or wheezing heard.   Abdominal: Abdomen is soft, non-distended, and non-tender.   Musculoskeletal: Moving all extremities well. No peripheral edema.  Neurological: Alert. Cranial nerves in tact. No focal neurological deficit.   ED Course   Procedures             Critical Care time:  none               Labs Ordered and Resulted from Time of ED Arrival Up to the Time of Departure from the ED   CBC WITH PLATELETS DIFFERENTIAL - Abnormal; Notable for the following:     WBC 3.5 (*)     RBC Count 1.98 (*)     Hemoglobin 6.7 (*)     Hematocrit 20.6 (*)      (*)     MCH 33.8 (*)     RDW 19.6 (*)     Platelet Count 93 (*)     All other components within normal limits   COMPREHENSIVE METABOLIC PANEL - Abnormal; Notable for the following:     Creatinine 1.27 (*)     GFR Estimate 54 (*)     Albumin 1.3 (*)     Protein Total 11.7 (*)     All other  components within normal limits   INR   PARTIAL THROMBOPLASTIN TIME   URINE MACROSCOPIC WITH REFLEX TO MICRO   ABO/RH TYPE AND SCREEN     Results for orders placed or performed during the hospital encounter of 01/30/17 (from the past 24 hour(s))   CBC with platelets, differential   Result Value Ref Range    WBC 3.5 (L) 4.0 - 11.0 10e9/L    RBC Count 1.98 (L) 4.4 - 5.9 10e12/L    Hemoglobin 6.7 (LL) 13.3 - 17.7 g/dL    Hematocrit 20.6 (L) 40.0 - 53.0 %     (H) 78 - 100 fl    MCH 33.8 (H) 26.5 - 33.0 pg    MCHC 32.5 31.5 - 36.5 g/dL    RDW 19.6 (H) 10.0 - 15.0 %    Platelet Count 93 (L) 150 - 450 10e9/L    Diff Method Pending    Comprehensive metabolic panel   Result Value Ref Range    Sodium 140 133 - 144 mmol/L    Potassium 4.3 3.4 - 5.3 mmol/L    Chloride 107 94 - 109 mmol/L    Carbon Dioxide 23 20 - 32 mmol/L    Anion Gap 10 3 - 14 mmol/L    Glucose 87 70 - 99 mg/dL    Urea Nitrogen 28 7 - 30 mg/dL    Creatinine 1.27 (H) 0.66 - 1.25 mg/dL    GFR Estimate 54 (L) >60 mL/min/1.7m2    GFR Estimate If Black 65 >60 mL/min/1.7m2    Calcium 8.6 8.5 - 10.1 mg/dL    Bilirubin Total 0.2 0.2 - 1.3 mg/dL    Albumin 1.3 (L) 3.4 - 5.0 g/dL    Protein Total 11.7 (H) 6.8 - 8.8 g/dL    Alkaline Phosphatase 70 40 - 150 U/L    ALT 7 0 - 70 U/L    AST 15 0 - 45 U/L   ABO/Rh type and screen   Result Value Ref Range    ABO Pending     RH(D) Pending     Antibody Screen Pending     Test Valid Only At Pending     Specimen Expires Pending      Results for orders placed or performed during the hospital encounter of 01/30/17   XR Chest 2 Views    Narrative    CHEST TWO VIEWS  1/30/2017 7:00 PM     COMPARISON: Two view chest x-ray 6/29/2016.    HISTORY: Weakness.      Impression    IMPRESSION: There is a new small left pleural effusion. There are new  patchy well-circumscribed opacities over the left upper lung, right  mid lung and right lower lung that may represent areas of loculated  pleural fluid. Pulmonary masses cannot be  completely excluded. The  lungs are otherwise clear. Heart size is enlarged but there is no  evidence for congestive failure.     ELOINA CARRASQUILLO MD         3:28 PM Patient Assessed    Assessments & Plan (with Medical Decision Making) labs were obtained. Discussed case with  who had discussed findings with  who visited the patient. Very poor living environment with patient covered in feces and urine. Felt patient was a vulnerable adult who should be admitted for further evaluation and placement. Patient's white countel is lower at 3.5 and his hgb is slighlty decreased at 6.7. Platelet count is low but similar to previous. Comprehensive metabolic panel with creatinine of 1.27 with is near baseline. INR and PTT are elevated. Due to positive blood in stool patient was typed and crossed. I am going to give the patient one unit of blood. Discussed case with Dr. Reyes Hospitalist who is willing to admit the patient for further evaluation and care. Patient is in agreement with this plan. Dr. Reyes added a CXR which revealed a small L pleural effusion with pathcy well circumscribed opacities which could represent areas of loculated pleural fluid or pulmonary masses. I discussed the case with the patient and his grand-daughter and the patient numerous times throughout the patient's stay and they are in agreement with the plan.      I have reviewed the nursing notes.    I have reviewed the findings, diagnosis, plan and need for follow up with the patient.    Current Discharge Medication List          Final diagnoses:   Anemia   Metastatic multiple myeloma to bone (H)   Failure to thrive in adult     This document serves as a record of the services and decisions personally performed and made by Dagoberto Betancur MD. It was created on HIS/HER behalf by Kendrick Barrett, a trained medical scribe. The creation of this document is based the provider's statements to the medical scribe.  Kendrick Barrett  3:27 PM 1/30/2017    Provider:   The information in this document, created by the medical scribe for me, accurately reflects the services I personally performed and the decisions made by me. I have reviewed and approved this document for accuracy prior to leaving the patient care area.  Dagoberto Betancur MD 3:27 PM 1/30/2017 1/30/2017   [facility] EMERGENCY DEPARTMENT      Dagoberto Betancur MD  01/31/17 1151       Progress Notes by Gissell Bess RN at 1/31/2017  7:46 AM     Author:  Gissell Bess RN Service:  (none) Author Type:  Registered Nurse    Filed:  1/31/2017  7:47 AM Note Time:  1/31/2017  7:46 AM Status:  Signed    :  Gissell Bess RN (Registered Nurse)           Received critical value: hgb 6.4. Dr Preston notified.       Progress Notes by Shyann Riddle RN at 1/30/2017  8:28 PM     Author:  Syhann Riddle RN Service:  (none) Author Type:  Registered Nurse    Filed:  1/30/2017  8:29 PM Note Time:  1/30/2017  8:28 PM Status:  Signed    :  Shyann Riddle RN (Registered Nurse)           [facility] ADMISSION NOTE    Patient admitted to room 2402 at approximately 2015 via cart from emergency room. Patient was accompanied by transport tech.     Verbal SBAR report received from lynn CISNEROS prior to patient arrival.     Patient ambulated to bed and walker. Patient alert and oriented X some confusion noted.. The patient is not having any pain.  . Admission vital signs: Blood pressure 130/84, pulse 106, temperature 98.4  F (36.9  C), temperature source Oral, resp. rate 16, weight 70 kg (154 lb 5.2 oz), SpO2 94 %. Patient was oriented to plan of care, call light, bed controls, telephone, bathroom and visiting hours.     The following safety risks were identified during admission: fall. Yellow risk band applied: YES.     Shyann Riddle           Progress Notes by Sonja Pope RN at 1/30/2017  5:04 PM     Author:  Sonja Pope RN Service:  (none) Author Type:      Filed:   "1/30/2017  5:18 PM Note Time:  1/30/2017  5:04 PM Status:  Addendum    :  Sonja Pope RN ()      Related Notes: Original Note by Sonja Pope RN () filed at 1/30/2017  5:17 PM         Care Management:  Received a luci from Asmita with [employer] ([phone]) stating she visited patient in response to a [facility] report. She reports patient was found in bed soiled with urine and feces. Per report patient has had services through [facility] ([phone] Fax: [phone]). Patient has [facility] service benefits.   Sonja Pope [phone]       ED Notes by Marli Toure RN at 1/30/2017  4:00 PM     Author:  Marli Toure RN Service:  (none) Author Type:  Registered Nurse    Filed:  1/30/2017  4:11 PM Note Time:  1/30/2017  4:00 PM Status:  Signed    :  Marli Toure RN (Registered Nurse)           Critical result given to BLAYNE Nelson       ED Notes by Leslie Decker RN at 1/30/2017  2:21 PM     Author:  Leslie Decker RN Service:  (none) Author Type:  Registered Nurse    Filed:  1/30/2017  2:24 PM Note Time:  1/30/2017  2:21 PM Status:  Signed    :  Leslie Decker RN (Registered Nurse)           Pt arrive via ems from home. Report that he was a welfare check by the [facility], found in bed, soiled himself. breakfast still sitting on the table. Pt came to ED willingly pt is alert and oriented. Has a hx of bone CA. Pt has no complaints and is unsure why he was \" taken \" pt reports he was tired and stayed up until 0300 that is why he was still in bed an did not have his breakfast yet. Pt states he lives with his son.       ED Notes by Leslie Decker RN at 1/30/2017  2:04 PM     Author:  Leslie Decker RN Service:  (none) Author Type:  Registered Nurse    Filed:  1/30/2017  2:04 PM Note Time:  1/30/2017  2:04 PM Status:  Signed    :  Leslie Decker RN (Registered Nurse)           Bed: ED07  Expected date: 1/30/17  Expected time:   Means of " arrival:   Comments:  560               Procedure Notes     No notes of this type exist for this encounter.      Progress Notes - Therapies (Notes from 01/30/17 through 02/02/17)     No notes of this type exist for this encounter.

## 2017-01-30 NOTE — IP AVS SNAPSHOT
"    [facility] SURGICAL: [phone]                                              INTERAGENCY TRANSFER FORM - PHYSICIAN ORDERS   2017                    Hospital Admission Date: 2017  TONI PETERSEN   : 1933  Sex: Male        Attending Provider: Izzy Preston MD     Allergies:  Nka    Infection:  None   Service:  HOSPITALIST    Ht:  5' 10\" (1.778 m)   Wt:  154 lb 12.2 oz (70.2 kg)   Admission Wt:  154 lb 5.2 oz (70 kg)    BMI:  22.21 kg/m 2   BSA:  1.86 m 2            Patient PCP Information     Provider PCP Type    Chidi Jacobson MD General      ED Clinical Impression     Diagnosis Description Comment Added By Time Added    Anemia [D64.9] Anemia [D64.9]  Dagoberto Betancur MD 2017  6:14 PM    Metastatic multiple myeloma to bone (H) [C90.00] Metastatic multiple myeloma to bone (H) [C90.00]  Dagoberto Betancur MD 2017 11:50 AM    Failure to thrive in adult [R62.7] Failure to thrive in adult [R62.7]  Dagoberto Betancur MD 2017 11:51 AM      Hospital Problems as of 2017              Priority Class Noted POA    Anemia   2012 Yes    Metastatic multiple myeloma to bone (H) High  2015 Yes    Failure to thrive in adult Medium  2015 Yes      Non-Hospital Problems as of 2017              Priority Class Noted    Profound impairment, one eye, 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    Backache   2008    HYPERLIPIDEMIA LDL GOAL <130   10/31/2010    HTN (hypertension)   2012    Multiple myeloma (H)   2013    Bone metastases (H)   2013    Wheezing Medium  3/19/2014    GERD (gastroesophageal reflux disease) Medium  2014    Senile nuclear sclerosis Medium  2015    Elevated liver enzymes Medium  2015    Persistent atrial fibrillation (H)   2015    Paroxysmal atrial fibrillation (H)   2015    Fatigue Medium  2015    Abdominal pain, left lower quadrant Medium  2015    " Colonic diverticular abscess   7/9/2015    Long term current use of anticoagulant therapy Medium  8/10/2015    Chemotherapy-induced neutropenia (H) Medium  5/11/2016    Advance care planning   9/9/2016      Code Status History     Date Active Date Inactive Code Status Order ID Comments User Context    6/30/2016 12:24 PM 1/30/2017  8:48 PM DNR/DNI [id number]  Jean Pierre Reyes MD Outpatient    6/27/2016  5:03 PM 6/30/2016 12:24 PM Full Code [id number]  David Perdomo MD Inpatient    7/11/2015 11:50 AM 7/17/2015  6:49 PM Full Code [id number]  Anusha Segal MD Inpatient    7/10/2015  2:38 PM 7/10/2015  2:39 PM Full Code [id number]  Robert Olvera MD Inpatient    7/9/2015  8:59 PM 7/10/2015  2:38 PM Full Code [id number]  Eduard Souza APRN CNP Inpatient    7/9/2015  2:38 PM 7/9/2015  8:57 PM Full Code [id number]  Lawrence Balbuena PA-C Inpatient    11/28/2012  2:52 PM 12/4/2012  4:33 PM Full Code [id number]  Pao Finn RN Inpatient         Medication Review      START taking        Dose / Directions Comments    morphine sulfate HIGH CONCENTRATE 100 MG/5ML (HIGH CONC) solution   Commonly known as:  ROXANOL *CONCENTRATED*   Used for:  Dyspnea, unspecified type, Metastatic multiple myeloma to bone (H)        Dose:  5-10 mg   Take 0.25-0.5 mLs (5-10 mg) by mouth every 2 hours as needed for moderate to severe pain or shortness of breath / dyspnea   Quantity:  30 mL   Refills:  0        order for DME   Used for:  Dyspnea, unspecified type        Equipment being ordered: Oxygen PRN for comfort; may range flow between 1-6 lpm   Quantity:  1 each   Refills:  0        senna-docusate 8.6-50 MG per tablet   Commonly known as:  SENOKOT-S;PERICOLACE   Used for:  Metastatic multiple myeloma to bone (H)        Dose:  1-2 tablet   Take 1-2 tablets by mouth At Bedtime   Quantity:  100 tablet   Refills:  0          CONTINUE these medications which have NOT CHANGED        Dose / Directions Comments     metoprolol 25 MG tablet   Commonly known as:  LOPRESSOR   Used for:  Persistent atrial fibrillation (H)        Dose:  25 mg   Take 1 tablet (25 mg) by mouth 2 times daily   Quantity:  180 tablet   Refills:  3        nepafenac 0.1 % ophthalmic susp   Commonly known as:  NEVANAC   Used for:  Senile nuclear sclerosis, left        Dose:  1 drop   Place 1 drop Into the left eye 2 times daily   Quantity:  1 Bottle   Refills:  3        omeprazole 20 MG CR capsule   Commonly known as:  priLOSEC   Used for:  Bronchitis with bronchospasm        Dose:  20 mg   Take 1 capsule (20 mg) by mouth daily   Quantity:  30 capsule   Refills:  3          STOP taking     ferrous sulfate 325 (65 FE) MG tablet   Commonly known as:  IRON           furosemide 20 MG tablet   Commonly known as:  LASIX           LISINOPRIL PO           multivitamin, therapeutic with minerals Tabs tablet           potassium chloride SA 20 MEQ CR tablet   Commonly known as:  K-DUR/KLOR-CON M                     Further instructions from your care team       From Coy Hawthorne, Palliative Care NP, Cell [phone]        End stage multiple myeloma    [facility], [phone] on call 24/7    Morphine (roxanol) for cough, breathlessness, pain    Note that INR has been rising despite no blood thinners; I advise no ASA or NSAIDs be given    OK to place on O2 PRN for comfort.  Use 1-6 lpm as needed for comfort.      Anemic on discharge; no transfusions.  Hgb dropping fairly rapidly in spite of transfusion on admission; platelets also falling pretty rapidly.  Creatinine also trending up.      Weak, fall risk.    10-lb weight loss in 3 months (6%); albumin falling pretty rapidly.  Expected due to end stage disease.     Prognosis: days to weeks    Patient pleasant but confused and lacks decisional capacity.  DAughter Shyann is primary decision-maker; her phone number is noted on the POLST form.     Stated he is Zoroastrianism and Taoism but didn't say anything  approvingly of either Voodoo.  Ask Shyann if she thinks he would want any sacraments or special blessings prior to death.      Thank you.     ++++++++++++++++++++++++++++++++++++++++++++++++++++++++++++++++++++++++++  From wound care -   Has a small stage II pressure ulcer on coccyx, this is only 2x3mm. Recommend TID, (and PRN following incontinence cares,) cleansing and barrier cream application.  Assist with turning every 2 hours as needed and maintain HOB elevation <30 degrees as able, use chair cushion when up and limiting sitting to 1 hour at a time as able.  Needs regular incontinence cares including barrier cream use.    After Care     Activity - Up with nursing assistance           Advance Diet as Tolerated       Follow this diet upon discharge: Orders Placed This Encounter  Advance Diet as Tolerated: Regular Diet Adult; Mechanical/Dental Soft Diet       Fall precautions           General info for SNF       Length of Stay Estimate: long Term Care  Condition at Discharge: Stable  Level of care:skilled   Rehabilitation Potential: Fair  Admission H&P remains valid and up-to-date: Yes  Recent Chemotherapy: N/A  Use Nursing Home Standing Orders: Yes       Mantoux instructions       Give two-step Mantoux (PPD) Per Facility Policy Yes             Referrals     Home Care Referral       Your provider has referred you to: [facility]: [facility] [phone]   [email or web address]    Extended Emergency Contact Information  Primary Emergency Contact: Shyann Zambrano   [facility]  Home Phone: [phone]  Mobile Phone: [phone]  Relation: Daughter  Secondary Emergency Contact: Jose A Jasso  Address: [address] [address] [facility]  Home Phone: [phone]  Mobile Phone: [phone]  Relation: Son    Patient Anticipated Discharge Date: 2/2/17   RN, PT, HHA to begin 24 - 48 hours after  discharge.  PLEASE EVALUATE AND TREAT (Evaluation timeline is 24 - 48 hrs. Please call if there is need for a variance to this timeline).    REASON FOR REFERRAL: Hospice - Diagnosis:  Metastatic multiple myeloma to bone    ADDITIONAL SERVICES NEEDED: hospice    OTHER PERTINENT INFORMATION: Patient was last seen by provider on 2/1/17 for   Failure to thrive in adult Metastatic multiple myeloma to bone.    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     Backache     HYPERLIPIDEMIA LDL GOAL <130     HTN (hypertension)     Anemia     Multiple myeloma (H)     Bone metastases (H)     Wheezing     GERD (gastroesophageal reflux disease)     Metastatic multiple myeloma to bone (H)     Senile nuclear sclerosis     Elevated liver enzymes     Persistent atrial fibrillation (H)     Paroxysmal atrial fibrillation (H)     Fatigue     Failure to thrive in adult     Abdominal pain, left lower quadrant     Colonic diverticular abscess     Long term current use of anticoagulant therapy     Chemotherapy-induced neutropenia (H)     Advance care planning      Documentation of Face to Face and Certification for Home Health Services    I certify that patient, Keven Jasso is under my care and that I, or a Nurse Practitioner or Physician's Assistant working with me, had a face-to-face encounter that meets the physician face-to-face encounter requirements  with this patient on: 2/1/2017.    This encounter with the patient was in whole, or in part, for the following medical condition, which is the primary reason for Home Health Care:  Metastatic multiple myeloma to bone .    I certify that, based on my findings, the following services are medically necessary Home Health Services: Nursing    My clinical findings support the need for the above services because: Nurse is needed: To provide assessment and oversight required in the home to assure adherence to the medical plan due to: hospice,  Metastatic multiple myeloma to bone ..    Further, I certify that my clinical findings support that this patient is homebound (i.e. absences from home require considerable and taxing effort and are for medical reasons or [facility] or infrequently or of short duration when for other reasons) because: Requires assistance of another person or specialized equipment to access medical services because patient: Range of motion limitations prevents ability to exit home safely..    Based on the above findings, I certify that this patient is confined to the home and needs intermittent skilled nursing care, physical therapy and/or speech therapy.  The patient is under my care, and I have initiated the establishment of the plan of care.  This patient will be followed by a physician who will periodically review the plan of care.    Physician/Provider to provide follow up care: Chidi Jacobson    [facility] certified Physician at time of discharge: Izzy Preston MD    Please be aware that coverage of these services is subject to the terms and limitations of your health insurance plan.  Call member services at your health plan with any benefit or coverage questions.             Follow-Up Appointment Instructions     Future Labs/Procedures    Follow Up and recommended labs and tests     Comments:    Hospice f/u      Follow-Up Appointment Instructions     Follow Up and recommended labs and  tests       Hospice f/u             Statement of Approval     Ordered          02/02/17 0946  I have reviewed and agree with all the recommendations and orders detailed in this document.   EFFECTIVE NOW     Approved and electronically signed by:  Izzy Preston MD           02/01/17 1040  I have reviewed and agree with all the recommendations and orders detailed in this document.   EFFECTIVE NOW     Approved and electronically signed by:  Izzy Preston MD

## 2017-01-30 NOTE — IP AVS SNAPSHOT
[facility]    [address] [facility] [address]    Phone:  [phone]    Fax:  [phone]                                       After Visit Summary   1/30/2017    Keven Jasso    MRN: [id number]           After Visit Summary Signature Page     I have received my discharge instructions, and my questions have been answered. I have discussed any challenges I see with this plan with the nurse or doctor.    ..........................................................................................................................................  Patient/Patient Representative Signature      ..........................................................................................................................................  Patient Representative Print Name and Relationship to Patient    ..................................................               ................................................  Date                                            Time    ..........................................................................................................................................  Reviewed by Signature/Title    ...................................................              ..............................................  Date                                                            Time

## 2017-01-30 NOTE — IP AVS SNAPSHOT
` ` Patient Information     Patient Name Sex     Keven Jasso ([id number]) Male 1933       Room Bed    2402 2402-89      Patient Demographics     Address Phone    [address] [facility] [address] [phone] (Home) *Preferred*  [phone] (Mobile)      Patient Ethnicity & Race     Ethnic Group Patient Race    American White      Emergency Contact(s)     Name Relation Home Work Mobile    Shyann Zambrano Daughter [phone]    Jose A Jasso [phone]      Documents on File        Status Date Received Description       Documents for the Patient    Insurance Card  04 bcbs     Face Sheet  04     Privacy Notice - [place] Received 04     Face Sheet Received 09     Insurance Card  06 bcbs     Insurance Card Received  bcbs     Face Sheet Received 09     External Medication Information Consent Accepted 10/17/11     Patient ID Received 12/21/16 exp 2017    Consent for Services - Hospital/Clinic Received 10/17/11     Consent for Services - Hospital/Clinic Received 10/25/12     External Medication Information Consent Accepted 10/25/12     HIM MARYANN Authorization  12     Consent to Communicate Received 12     Consent for EHR Access  13 Copied from existing Consent for services - C/HOD collected on 10/25/2012    [facility] Specified Other       HIM MARYANN Authorization  10/23/13     Consent for Services - Hospital/Clinic Received 10/30/13     Consent for Services - Hospital/Clinic Received 14     Privacy Notice - [place] Received 07/14/15     Consent for Services - Hospital/Clinic Received 12/10/15     Business/Insurance/Care Coordination/Health Form - Patient  16 PATIENT ACCESS NETWORK    Consent for Services/Privacy Notice - Hospital/Clinic Received 16     Advance Directives and Living Will Received 16 POLST 2016    Consent for Services - Geriatrics Received 16     HIM MARYANN Authorization   11/18/16 [facility]    Insurance Card Received 12/21/16        Documents for the Encounter    CMS IM for Patient Signature Received 01/31/17       Admission Information     Attending Provider Admitting Provider Admission Type Admission Date/Time    [facility] 01/30/17  1404    Discharge Date Hospital Service Auth/Cert Status Service Area     Hospitalist [facility]    Unit Room/Bed Admission Status    [facility] 2402/2402-01 Admission (Confirmed)            Admission     Complaint    Anemia      Hospital Account     Name Acct ID Class Status Primary Coverage    Keven Jasso [id number] Inpatient Open MEDICARE - MEDICARE            Guarantor Account (for Hospital Account #[id number])     Name Relation to Pt Service Area Active? Acct Type    Keven Jasso  FCS Yes Personal/Family    Address Phone          [address] [place] [address] [phone](H)              Coverage Information (for Hospital Account #[id number])     F/O Payor/Plan Precert #    MEDICARE/MEDICARE     Subscriber Subscriber #    Keven Jasso [id number]    Address Phone    ATTN CLAIMS  [address]  [facility] [place] [address] [phone]

## 2017-01-30 NOTE — IP AVS SNAPSHOT
"    [facility] SURGICAL: [phone]                                              INTERAGENCY TRANSFER FORM - LAB / IMAGING / EKG / EMG RESULTS   2017                    Hospital Admission Date: 2017  TONI PETERSEN   : 1933  Sex: Male        Attending Provider: Izzy Preston MD     Allergies:  Nka    Infection:  None   Service:  HOSPITALIST    Ht:  1.778 m (5' 10\")   Wt:  70.2 kg (154 lb 12.2 oz)   Admission Wt:  70 kg (154 lb 5.2 oz)    BMI:  22.21 kg/m 2   BSA:  1.86 m 2            Patient PCP Information     Provider PCP Type    Chidi Jacobson MD General         Lab Results - 3 Days (17 - 17)      Comprehensive metabolic panel [[id number]] (Abnormal)  Resulted: 17 0821, Result status: Final result    Ordering provider: Jean Pierre Reyes MD  17 0000 Resulting lab: [facility]    Specimen Information    Type Source Collected On   Blood  17 0710          Components       Value Reference Range Flag Lab   Sodium 139 133 - 144 mmol/L  59   Potassium 4.3 3.4 - 5.3 mmol/L  59   Chloride 106 94 - 109 mmol/L  59   Carbon Dioxide 25 20 - 32 mmol/L  59   Anion Gap 8 3 - 14 mmol/L  59   Glucose 81 70 - 99 mg/dL  59   Urea Nitrogen 32 7 - 30 mg/dL H 59   Creatinine 1.34 0.66 - 1.25 mg/dL H 59   GFR Estimate 51 >60 mL/min/1.7m2 L 59   Comment:  Non  GFR Calc   GFR Estimate If Black 61 >60 mL/min/1.7m2  59   Comment:  African American GFR Calc   Calcium 8.4 8.5 - 10.1 mg/dL L 59   Bilirubin Total 0.2 0.2 - 1.3 mg/dL  59   Albumin 1.2 3.4 - 5.0 g/dL L 59   Protein Total 11.3 6.8 - 8.8 g/dL H 59   Alkaline Phosphatase 69 40 - 150 U/L  59   ALT 9 0 - 70 U/L  59   AST 14 0 - 45 U/L  59            CK total [[id number]]  Resulted: 17 0805, Result status: Final result    Ordering provider: Jean Pierre Reyes MD  17 0500 Resulting lab: [facility]    Specimen Information    Type Source Collected On   Blood  " 01/31/17 0710          Components       Value Reference Range Flag Lab   CK Total 32 30 - 300 U/L  59            CBC with platelets [[id number]] (Abnormal)  Resulted: 01/31/17 0744, Result status: Final result    Ordering provider: Jean Pierre Reyes MD  01/31/17 0000 Resulting lab: [facility]    Specimen Information    Type Source Collected On   Blood  01/31/17 0710          Components       Value Reference Range Flag Lab   WBC 3.8 4.0 - 11.0 10e9/L L 59   RBC Count 1.94 4.4 - 5.9 10e12/L L 59   Hemoglobin 6.4 13.3 - 17.7 g/dL LL 59   Comment:         This result has been called to JERALD DUARTE by [facility] Student on 01 31 2017 at   0741, and has been read back. TW AMS     Hematocrit 19.8 40.0 - 53.0 % L 59    78 - 100 fl H 59   MCH 33.0 26.5 - 33.0 pg  59   MCHC 32.3 31.5 - 36.5 g/dL  59   RDW 20.8 10.0 - 15.0 % H 59   Platelet Count 96 150 - 450 10e9/L L 59            Partial thromboplastin time [[id number]] (Abnormal)  Resulted: 01/31/17 0728, Result status: Final result    Ordering provider: Jean Pierre Reyes MD  01/31/17 0500 Resulting lab: [facility]    Specimen Information    Type Source Collected On   Blood  01/31/17 0710          Components       Value Reference Range Flag Lab   PTT 70 22 - 37 sec H 59            INR [[id number]] (Abnormal)  Resulted: 01/31/17 0728, Result status: Final result    Ordering provider: Jean Pierre Reyes MD  01/31/17 0500 Resulting lab: [facility]    Specimen Information    Type Source Collected On   Blood  01/31/17 0710          Components       Value Reference Range Flag Lab   INR 1.84 0.86 - 1.14  H 59            INR [[id number]] (Abnormal)  Resulted: 01/30/17 1800, Result status: Final result    Ordering provider: Dagoberto Betancur MD  01/30/17 1608 Resulting lab: [facility]    Specimen Information    Type Source Collected On   Blood  01/30/17 1520          Components       Value Reference  Range Flag Lab   INR 1.61 0.86 - 1.14  H 59            PTT [[id number]] (Abnormal)  Resulted: 01/30/17 1800, Result status: Final result    Ordering provider: Dagoberto Betancur MD  01/30/17 1608 Resulting lab: [facility]    Specimen Information    Type Source Collected On   Blood  01/30/17 1520          Components       Value Reference Range Flag Lab   PTT 77 22 - 37 sec H 59            CBC with platelets, differential [[id number]] (Abnormal)  Resulted: 01/30/17 1724, Result status: Final result    Ordering provider: Morgan Durbin DO  01/30/17 1441 Resulting lab: [facility]    Specimen Information    Type Source Collected On   Blood  01/30/17 1520          Components       Value Reference Range Flag Lab   WBC 3.5 4.0 - 11.0 10e9/L L 59   RBC Count 1.98 4.4 - 5.9 10e12/L L 59   Hemoglobin 6.7 13.3 - 17.7 g/dL LL 59   Comment:         This result has been called to KRISTINE MIRELES RN by Jose Rodriguez on 01 30 2017   at 1558, and has been read back.     Hematocrit 20.6 40.0 - 53.0 % L 59    78 - 100 fl H 59   MCH 33.8 26.5 - 33.0 pg H 59   MCHC 32.5 31.5 - 36.5 g/dL  59   RDW 19.6 10.0 - 15.0 % H 59   Platelet Count 93 150 - 450 10e9/L L 59   Diff Method Automated Method   59   % Neutrophils 45.2 %  59   % Lymphocytes 48.1 %  59   % Monocytes 4.9 %  59   % Eosinophils 0.9 %  59   % Basophils 0.3 %  59   % Immature Granulocytes 0.6 %  59   Absolute Neutrophil 1.6 1.6 - 8.3 10e9/L  59   Absolute Lymphocytes 1.7 0.8 - 5.3 10e9/L  59   Absolute Monocytes 0.2 0.0 - 1.3 10e9/L  59   Absolute Eosinophils 0.0 0.0 - 0.7 10e9/L  59   Absolute Basophils 0.0 0.0 - 0.2 10e9/L  59   Abs Immature Granulocytes 0.0 0 - 0.4 10e9/L  59   Anisocytosis Moderate   59   Rouleaux Increased   59   Platelet Estimate Decreased   59            UA reflex to Microscopic [[id number]]  Resulted: 01/30/17 1644, Result status: Final result    Ordering provider: Morgan Durbin DO  01/30/17 1444  Resulting lab: [facility]    Specimen Information    Type Source Collected On   urine clean catch Urine clean catch 01/30/17 1628          Components       Value Reference Range Flag Lab   Color Urine Yellow   59   Appearance Urine Clear   59   Glucose Urine Negative NEG mg/dL  59   Bilirubin Urine Negative NEG   59   Ketones Urine Negative NEG mg/dL  59   Specific Gravity Urine 1.013 1.003 - 1.035   59   Blood Urine Negative NEG   59   pH Urine 6.5 5.0 - 7.0 pH  59   Protein Albumin Urine Negative NEG mg/dL  59   Urobilinogen mg/dL Normal 0.0 - 2.0 mg/dL  59   Nitrite Urine Negative NEG   59   Leukocyte Esterase Urine Negative NEG   59   Source Midstream Urine   59            Comprehensive metabolic panel [[id number]] (Abnormal)  Resulted: 01/30/17 1549, Result status: Final result    Ordering provider: Morgan Durbin DO  01/30/17 1441 Resulting lab: [facility]    Specimen Information    Type Source Collected On   Blood  01/30/17 1520          Components       Value Reference Range Flag Lab   Sodium 140 133 - 144 mmol/L  59   Potassium 4.3 3.4 - 5.3 mmol/L  59   Chloride 107 94 - 109 mmol/L  59   Carbon Dioxide 23 20 - 32 mmol/L  59   Anion Gap 10 3 - 14 mmol/L  59   Glucose 87 70 - 99 mg/dL  59   Urea Nitrogen 28 7 - 30 mg/dL  59   Creatinine 1.27 0.66 - 1.25 mg/dL H 59   GFR Estimate 54 >60 mL/min/1.7m2 L 59   Comment:  Non  GFR Calc   GFR Estimate If Black 65 >60 mL/min/1.7m2  59   Comment:  African American GFR Calc   Calcium 8.6 8.5 - 10.1 mg/dL  59   Bilirubin Total 0.2 0.2 - 1.3 mg/dL  59   Albumin 1.3 3.4 - 5.0 g/dL L 59   Protein Total 11.7 6.8 - 8.8 g/dL H 59   Alkaline Phosphatase 70 40 - 150 U/L  59   ALT 7 0 - 70 U/L  59   AST 15 0 - 45 U/L  59            Testing Performed By     Lab - Abbreviation Name Director Address Valid Date Range    59 - Unknown [facility] Unknown [address] [facility] [address] 12/31/14 1006 -  Present            Unresulted Labs     None         Imaging Results - 3 Days (01/30/17 - 01/30/17)      XR Chest 2 Views [[id number]]  Resulted: 01/30/17 1919, Result status: Final result    Ordering provider: Jean Pierre Reyes MD  01/30/17 1844 Resulted by: Eloina Soriano MD    Performed: 01/30/17 1854 - 01/30/17 1900 Resulting lab: RADIOLOGY RESULTS    Narrative:       CHEST TWO VIEWS  1/30/2017 7:00 PM     COMPARISON: Two view chest x-ray 6/29/2016.    HISTORY: Weakness.      Impression:       IMPRESSION: There is a new small left pleural effusion. There are new  patchy well-circumscribed opacities over the left upper lung, right  mid lung and right lower lung that may represent areas of loculated  pleural fluid. Pulmonary masses cannot be completely excluded. The  lungs are otherwise clear. Heart size is enlarged but there is no  evidence for congestive failure.     ELOINA SORIANO MD    Specimen Information    Type Source Collected On                  Testing Performed By     Lab - Abbreviation Name Director Address Valid Date Range    104 - Rad Rslts RADIOLOGY RESULTS Unknown Unknown 02/16/05 1553 - Present            Encounter-Level Documents:     There are no encounter-level documents.      Order-Level Documents:     There are no order-level documents.

## 2017-01-31 NOTE — PLAN OF CARE
Problem: Skin Integrity Impairment, Risk/Actual (Adult)  Goal: Identify Related Risk Factors and Signs and Symptoms  Related risk factors and signs and symptoms are identified upon initiation of Human Response Clinical Practice Guideline (CPG)pt will maintain intact skin, no pressure ulcers or rashes present.  Pt has no open areas noted skin to buttocks noted to be pink from sitting on the cart with out pressure areas noted. Skin in general is dry and flaky , he has on scab to right knee and a much smaller scab to left shin otherwise intact. Staff soaked both hands in soapy water and trimmed dirty nails.Beard and mustache appear to be trimmed neatly. Clothing is dirty. Pt does not appear to be a good historian, he is alert and oriented but does is not able to give an accurate account of what happened today.He does state that his glasses and upper dentures are at home. No family present on admit to floor.

## 2017-01-31 NOTE — PLAN OF CARE
Problem: Skin Integrity Impairment, Risk/Actual (Adult)  Goal: Identify Related Risk Factors and Signs and Symptoms  Related risk factors and signs and symptoms are identified upon initiation of Human Response Clinical Practice Guideline (CPG)pt will maintain intact skin, no pressure ulcers or rashes present.   ONE unit PRBC infused without problems. Pt awake  Most of the night, up to the BR about every of hours. He is calm and cooperative but a little out of sorts with the situation.

## 2017-01-31 NOTE — PLAN OF CARE
Problem: Goal Outcome Summary  Goal: Goal Outcome Summary  Scabs on knees are intact and dry. Surrounding tissue is normal. Tolerating activity, ambulating with sba and walker. No c/o soa with activity. Denies pain.

## 2017-01-31 NOTE — PROGRESS NOTES
[facility] ADMISSION NOTE    Patient admitted to room 2402 at approximately 2015 via cart from emergency room. Patient was accompanied by transport tech.     Verbal SBAR report received from lynn CISNEROS prior to patient arrival.     Patient ambulated to bed and walker. Patient alert and oriented X some confusion noted.. The patient is not having any pain.  . Admission vital signs: Blood pressure 130/84, pulse 106, temperature 98.4  F (36.9  C), temperature source Oral, resp. rate 16, weight 70 kg (154 lb 5.2 oz), SpO2 94 %. Patient was oriented to plan of care, call light, bed controls, telephone, bathroom and visiting hours.     The following safety risks were identified during admission: fall. Yellow risk band applied: YES.     Shyann Riddle

## 2017-01-31 NOTE — PLAN OF CARE
Problem: Skin Integrity Impairment, Risk/Actual (Adult)  Goal: Identify Related Risk Factors and Signs and Symptoms  Related risk factors and signs and symptoms are identified upon initiation of Human Response Clinical Practice Guideline (CPG)pt will maintain intact skin, no pressure ulcers or rashes present.   Pt repositioning himself in bed, set off the bed alarm. Pt given numerous instructions to use the call light for assistance and he verbalizes understanding yet he has not used the call light . Pt able to get in and out of bed, can lift his legs into bed slowly. He can walk to the BR with walker and gait belt assist of one, he has been continent though has not requested to go but goes when he is taken .

## 2017-01-31 NOTE — PROGRESS NOTES
"[facility] Internal Medicine Progress Note     Date of Service (when I saw the patient): 01/31/2017      REASON FOR ADMISSION / INTERVAL HISTORY:  Weakness, social disposition issues. See details below.    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 No change post tx.   -needs palliative rx/hospice    ACUTE ON CRONIC ANEMIA  Due to end stage MM.   -no further interevntion    PANCYTOPENIA  Due to MM  -no intervention    HTN  Stable--low-nl  Continue metoprolol. D/c lisinopril.    DISPO  Pt being filed as vulnerable adult. SW on case. Needs placement    SUSIE LOCK MD   Pg [phone]    DVT Prhylaxis: Ambulate every shift  Code Status: DNR/DNI    ROS:  As described in A/P and Exam.  Otherwise ALL are  negative.    PHYSICAL EXAM:  All vitals have been reviewed    Blood pressure 109/61, pulse 99, temperature 97.7  F (36.5  C), temperature source Oral, resp. rate 18, height 1.778 m (5' 10\"), weight 70.2 kg (154 lb 12.2 oz), SpO2 96 %.    I/O this shift:  In: 360 [P.O.:360]  Out: 100 [Urine:100]    GENERAL APPEARANCE: healthy, alert and no distress  EYES: conjunctiva clear, eyes grossly normal  HENT: external ears and nose normal   NECK: supple, no masses or adenopathy  RESP: lungs clear to auscultation - no rales, rhonchi or wheezes  CV: regular rate and rhythm, normal S1 S2, no S3 or S4 and no murmur, click or rub   ABDOMEN: soft, nontender, no HSM or masses and bowel sounds normal  MS: no clubbing, cyanosis; no edema  SKIN: clear without significant rashes or lesions  NEURO: -non-focal moves all 4 extr    ROUTINE  LABS (Last four results)  CMP  Recent Labs  Lab 01/31/17  0710 01/30/17  1520    140   POTASSIUM 4.3 4.3   CHLORIDE 106 107   CO2 25 23   ANIONGAP 8 10   GLC 81 87   BUN 32* 28   CR 1.34* 1.27*   GFRESTIMATED 51* 54*   GFRESTBLACK 61 65   FLOYD 8.4* 8.6   PROTTOTAL 11.3* 11.7*   ALBUMIN 1.2* 1.3*   BILITOTAL 0.2 0.2   ALKPHOS 69 70   AST 14 15   ALT 9 7     CBC  Recent " Labs  Lab 01/31/17  0710 01/30/17  1520   WBC 3.8* 3.5*   RBC 1.94* 1.98*   HGB 6.4* 6.7*   HCT 19.8* 20.6*   * 104*   MCH 33.0 33.8*   MCHC 32.3 32.5   RDW 20.8* 19.6*   PLT 96* 93*     INR  Recent Labs  Lab 01/31/17  0710 01/30/17  1520   INR 1.84* 1.61*     Arterial Blood GasNo lab results found in last 7 days.    Recent Results (from the past 24 hour(s))   XR Chest 2 Views    Narrative    CHEST TWO VIEWS  1/30/2017 7:00 PM     COMPARISON: Two view chest x-ray 6/29/2016.    HISTORY: Weakness.      Impression    IMPRESSION: There is a new small left pleural effusion. There are new  patchy well-circumscribed opacities over the left upper lung, right  mid lung and right lower lung that may represent areas of loculated  pleural fluid. Pulmonary masses cannot be completely excluded. The  lungs are otherwise clear. Heart size is enlarged but there is no  evidence for congestive failure.     ELOINA CARRASQUILLO MD

## 2017-01-31 NOTE — PLAN OF CARE
Problem: Skin Integrity Impairment, Risk/Actual (Adult)  Goal: Identify Related Risk Factors and Signs and Symptoms  Related risk factors and signs and symptoms are identified upon initiation of Human Response Clinical Practice Guideline (CPG)pt will maintain intact skin, no pressure ulcers or rashes present.   Blood Bank preparing and double checking unit of blood. Pt has antibodies that require extra time.

## 2017-01-31 NOTE — CONSULTS
Reason for Referral: DC Planning    Presenting Problem: Anemia    Cognitive Behavioral Status: awake    Support system: family    Current Living Situation:   Home Setting[place]   Living Situation: Other:  Lives with several family members   Function Status / Assistive Device: wheeled walker    Employment/ Financial/ Insurance Concerns: retired          No Insurance issues identified      Housing Concerns: Yes: pt is not taken care of at home by family    Health Care Directives:  POLST is in chart, no HCD    Assessment: This writer spoke with pts Shyann hutchins at length introduced self and role as pt is confused. She reports that she is very concerned about her father's living situation. She reports that her father lives with 4 adults and she believes that they are not caring for him. She reports a long history of drug use in the house and she is not comfortable with him returning him home.  [facility] for vulnerable adults also involved, the [facility] is stating that he cannot return home as he is a vulnerable adult. Pt is a . IF pt chooses to enroll in hospice his SNF will be covered at 100% at a [facility] facility.     This writer received permission from liset Boothe to send SNF referrals to   [facility] (Phone: [phone]) Fax: ([phone])  [facility] Phone: ([phone]) Fax: ([phone])  [facility] (Admission phone: [phone] Main phone: [phone] Admission Fax: [phone] Main Fax: [phone])  [facility] (Admissions phone: [phone] Main phone: [phone] Fax: [phone])  [facility] (Admissions: [phone] Main phone: [phone] Fax: [phone])  [facility] (Admissions Phone: [phone] Main Phone: [phone] Fax: [phone])    Referrals are pending.       Plan: Palliative care consult, will need to clarify goals of care prior to placement.      Domonique Myers MS, [facility], [phone]

## 2017-01-31 NOTE — H&P
HISTORY OF PRESENT ILLNESS:  Keven Jasso was apparently being seen by a  in his home because of concerns about his vulnerable status.  He was found in bed with stool and urine throughout the bed.  Police were called in and brought him into the emergency room.  It was felt he was unsafe to go home on his own.      He himself says he had just been up watching TV, and then overslept, and that was the problem.  He does not think there is any problem.  He tells me his appetite has been good.  He has been weak but able to get around with a cane.  He is not driving.      The social history is somewhat complicated in that he lives with a son and multiple family members of his, including six adults.  According to sister, Shyann, there are significant drug problems in the house, and she feels the patient is very vulnerable there, but he prefers to stay in the house with his son, and therefore will say anything he can to stay in the home.      He does have multiple myeloma which is end-stage.  At his last visit with Oncology, they advised no further chemo, and consideration of palliative care, but it does not look like that has been set up.      SOCIAL HISTORY:  He lives with his son and daughter-in-law and their multiple adult children.  He has had no tobacco for years.  No alcohol for years.  He has been admitted three times in the last year and a half for social concerns similar to this.      FAMILY HISTORY:  Father had MI.  Mother had breast cancer.  A sister had MI.      REVIEW OF SYSTEMS:  Ten-point review of systems with the patient was negative except for a 2-month history of a cough and now about a 2-week history of some shortness of breath.  He admits he is weak, but thinks he has been able to get around okay with a walker.  He does not go very far, from the bed to a chair.  Denies any cardiorespiratory, GI or  complaints.  He thinks he had some blood in his stool a year ago and maybe recently  once.  His hemoglobins have been sliding lower.      OBJECTIVE:   GENERAL:  He is weak, frail, alert.  Appears oriented x3; knows the month, the president, the year, where he is in the hospital, and where he lives.   VITAL SIGNS:  Afebrile.  Pulse is 106.  Respirations 16.  Blood pressure 104/53.  O2 sat 92% on room air.   HEENT:  Eyes show pupils somewhat dilated on the left.  The right is fixed and dilated and has no vision whatsoever, and the right also has significant ptosis.  EOMs on the left are intact.  TMs normal.  Nasal mucosa normal.  Throat is normal.   NECK:  Supple, without masses, nodes or thyromegaly.   CHEST:  Clear, but decreased breath sounds on the left compared to the right.  No rales or rhonchi or signs of consolidation.   CARDIOVASCULAR:  Regular rate and rhythm without murmur that I can hear.  Soft heart sounds.  No JVD or HJR.  No edema.  Pulses are thready but palpable in the pedals.  There is no edema.   ABDOMEN:  Soft, nontender, without HSM or masses.   GENITOURINARY:  Normal male, descended.   RECTAL:  Exam done by the ED was positive for Hemoccult, but brown stool.   EXTREMITIES:  He has very dirty fingernails.  He has some weakness in the left shoulder which he states is from pain, and has a hard time holding that arm up against gravity, as it tends to drift downward fairly quickly.  Passive range of motion, however, is good.   SKIN:  He is generally dirty.  He has smears of stool up his back.  No areas of obvious skin breakdown.   NEURO:  Equal strength, sensation and rapid alternating movements in the fingers and hands and wrists.  Again, the left shoulder seems weaker, though this may be a pain behavior.  Lower extremities show good strength, sensation, and DTRs.      LABS:  Hemoglobin is 6.7, white count is 3.5, platelet count 93,000.  Last hemoglobin was 6.9, and before that, earlier in the month it was 8.1.  Creatinine is 1.27, with a baseline of about 1.5.  Albumin is 1.3, with  a baseline of about 1.9.  INR 1.61, PTT 77; this is on no anticoagulation.      Chest x-ray is pending, ordered.      ASSESSMENT:   1.  Generalized severe weakness secondary to end-stage multiple myeloma:  We will transfuse and see if we can get some improvement in his strength.  I will ask Physical Therapy see him.  I fear this is actually just end-stage multiple myeloma.  No further chemotherapy is recommended by Oncology.  They have recommended consideration of palliative care.   2.  Vulnerable adult:  He was found in very unsanitary conditions, lying in bed in stool and urine.  According to the sister, Shyann, the brother and his extended family are living in the house, and there is significant dysfunction and potential drug use.  He likely needs an alternative living situation.   3.  End-stage multiple myeloma:  No further chemotherapy is warranted.  He has multiple sites of this; see below.   4.  Pancytopenia:  This is secondary to his multiple myeloma.  We will transfuse to see if we can get him some strengths temporarily, but I think this is a temporizing measure.   5.  Coagulopathy:  INR and PTT are both elevated.  I suspect this is due to the dysproteinemia of multiple myeloma.  Would try some vitamin K and see if it makes a difference, though I suspect it will not.   6.  CODE STATUS:  DNR/DNI.  This was discussed at this time.   7.  Prophylaxis:  Mechanical for now.  He has had some heme-positive stools and has intrinsic coagulopathy.   8.  Disposition:  I believe he needs inpatient care for further discussion, primarily hopefully getting him into a hospice situation.  We will have Palliative Care see him in the morning.  We will transfuse for now as well.  He is a vulnerable adult, and we will have  assess for improved living arrangements.         MADELYN EDWARDS MD             D: 01/30/2017 19:13   T: 01/31/2017 03:00   MT: [id number]      Name:     MARLEETONI ROCK   MRN:       [id number]        Account:      [id number]   :      1933           Admitted:     [id number]      Document: [id number]       cc: Go Jacobson MD

## 2017-02-01 NOTE — PLAN OF CARE
Problem: Goal Outcome Summary  Goal: Goal Outcome Summary  Outcome: No Change  Continues to be confused as to place, time and situation. Tolerating ambulating to bathroom with assist. Up in chair for supper, good appetite. Continues to set off bed and chair alarm.

## 2017-02-01 NOTE — PROGRESS NOTES
"[facility] Internal Medicine Progress Note     Date of Service (when I saw the patient): 02/01/2017      REASON FOR ADMISSION / INTERVAL HISTORY:  Weakness, social disposition issues. See details below.    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 No change post tx.   -needs palliative rx/hospice    ACUTE ON CRONIC ANEMIA  Due to end stage MM.   -no further interevntion    PANCYTOPENIA  Due to MM  -no intervention    HTN  Stable--low-nl  Continue metoprolol. D/c lisinopril.    DISPO  Pt being filed as vulnerable adult. SW on case. Needs placement    SUSIE LOCK MD   Pg [phone]    DVT Prhylaxis: Ambulate every shift  Code Status: DNR/DNI    ROS:  As described in A/P and Exam.  Otherwise ALL are  negative.    PHYSICAL EXAM:  All vitals have been reviewed    Blood pressure 100/56, pulse 99, temperature 97.7  F (36.5  C), temperature source Oral, resp. rate 16, height 1.778 m (5' 10\"), weight 70.2 kg (154 lb 12.2 oz), SpO2 95 %.    I/O this shift:  In: 180 [P.O.:180]  Out: 250 [Urine:250]    GENERAL APPEARANCE: healthy, alert and no distress  EYES: conjunctiva clear, eyes grossly normal  HENT: external ears and nose normal   NECK: supple, no masses or adenopathy  RESP: lungs clear to auscultation - no rales, rhonchi or wheezes  CV: regular rate and rhythm, normal S1 S2, no S3 or S4 and no murmur, click or rub   ABDOMEN: soft, nontender, no HSM or masses and bowel sounds normal  MS: no clubbing, cyanosis; no edema  SKIN: clear without significant rashes or lesions  NEURO: -non-focal moves all 4 extr    ROUTINE  LABS (Last four results)  CMP    Recent Labs  Lab 01/31/17  0710 01/30/17  1520    140   POTASSIUM 4.3 4.3   CHLORIDE 106 107   CO2 25 23   ANIONGAP 8 10   GLC 81 87   BUN 32* 28   CR 1.34* 1.27*   GFRESTIMATED 51* 54*   GFRESTBLACK 61 65   FLOYD 8.4* 8.6   PROTTOTAL 11.3* 11.7*   ALBUMIN 1.2* 1.3*   BILITOTAL 0.2 0.2   ALKPHOS 69 70   AST 14 15   ALT 9 7     CBC    Recent " Labs  Lab 01/31/17  0710 01/30/17  1520   WBC 3.8* 3.5*   RBC 1.94* 1.98*   HGB 6.4* 6.7*   HCT 19.8* 20.6*   * 104*   MCH 33.0 33.8*   MCHC 32.3 32.5   RDW 20.8* 19.6*   PLT 96* 93*     INR    Recent Labs  Lab 01/31/17  0710 01/30/17  1520   INR 1.84* 1.61*     Arterial Blood GasNo lab results found in last 7 days.    No results found for this or any previous visit (from the past 24 hour(s)).

## 2017-02-01 NOTE — DISCHARGE SUMMARY
[facility] Hospitalist Discharge Summary    Keven Jasso MRN# [id number]   Age: 83 year old YOB: 1933     Date of Admission:  1/30/2017  Date of Discharge::  2/1/2017  Admitting Physician:  Jean Pierre Reyes MD  Discharge Physician:  Izzy Preston MD  Primary Physician: Chidi Jacobson  Transferring Facility: N/A     Home clinic: [facility]          Admission Diagnoses:   Anemia [D64.9]          Discharge Diagnosis:   Principle diagnosis: end stage MM with weakness/anemia.  Secondary diagnoses:  Active Problems:    Metastatic multiple myeloma to bone (H)    Failure to thrive in adult    Anemia         Brief History of Presenting Illness:   As per admit hx  Keven Jasso was apparently being seen by a  in his home because of concerns about his vulnerable status.  He was found in bed with stool and urine throughout the bed.  Police were called in and brought him into the emergency room.  It was felt he was unsafe to go home on his own.        He himself says he had just been up watching TV, and then overslept, and that was the problem.  He does not think there is any problem.  He tells me his appetite has been good.  He has been weak but able to get around with a cane.  He is not driving.        The social history is somewhat complicated in that he lives with a son and multiple family members of his, including six adults.  According to sisterShyann, there are significant drug problems in the house, and she feels the patient is very vulnerable there, but he prefers to stay in the house with his son, and therefore will say anything he can to stay in the home.        He does have multiple myeloma which is end-stage.  At his last visit with Oncology, they advised no further chemo, and consideration of palliative care, but it does not look like that has been set up.      No results found for this or any previous visit (from the past 24 hour(s)).         Hospital Course:    WEAKNESS due to end stage MM  Has hg of 6.4. No change post tx.    -going with palliative rx/hospice    ACUTE ON CRONIC ANEMIA  Due to end stage MM.    -no further interevntion    PANCYTOPENIA  Due to MM  -no intervention    HTN  Stable--low-nl  -continue metoprolol    ACUTE METABOLIC ENCEPHALOPATHY  Likely due to progressive cancer. Pt appears more somnolant today. Palliative care see. Pt to go to NH with hospice today    DISPO  To NH on hospice            Procedures:   No procedures performed during this admission         Allergies:      Allergies   Allergen Reactions     Nka [No Known Allergies]              Medications Prior to Admission:     Prescriptions prior to admission   Medication Sig Dispense Refill Last Dose     omeprazole (PRILOSEC) 20 MG capsule Take 1 capsule (20 mg) by mouth daily 30 capsule 3 Past Week at Unknown time     metoprolol (LOPRESSOR) 25 MG tablet Take 1 tablet (25 mg) by mouth 2 times daily 180 tablet 3 Past Week at Unknown time     nepafenac (NEVANAC) 0.1 % ophthalmic suspension Place 1 drop Into the left eye 2 times daily 1 Bottle 3 Past Week at Unknown time     [DISCONTINUED] ferrous sulfate (IRON) 325 (65 FE) MG tablet Take 1 tablet (325 mg) by mouth 2 times daily 60 tablet 11 Past Week at Unknown time     [DISCONTINUED] potassium chloride SA (K-DUR/KLOR-CON M) 20 MEQ CR tablet Take 1 tablet (20 mEq) by mouth daily 30 tablet 0 Past Week at Unknown time     [DISCONTINUED] furosemide (LASIX) 20 MG tablet Take 1 tablet (20 mg) by mouth daily 30 tablet 3 Past Week at Unknown time     [DISCONTINUED] multivitamin, therapeutic with minerals (THERA-VIT-M) TABS Take 1 tablet by mouth daily 30 each 0 Past Week at Unknown time     [DISCONTINUED] LISINOPRIL PO Take 2.5 mg by mouth daily    Past Week at Unknown time             Discharge Medications:     Current Discharge Medication List      START taking these medications    Details   order for DME Equipment being ordered: Oxygen PRN for  "comfort; may range flow between 1-6 lpm  Qty: 1 each, Refills: 0    Associated Diagnoses: Dyspnea, unspecified type      morphine sulfate HIGH CONCENTRATE (ROXANOL *CONCENTRATED*) 100 MG/5ML (HIGH CONC) solution Take 0.25-0.5 mLs (5-10 mg) by mouth every 2 hours as needed for moderate to severe pain or shortness of breath / dyspnea  Qty: 30 mL, Refills: 0    Associated Diagnoses: Dyspnea, unspecified type; Metastatic multiple myeloma to bone (H)      senna-docusate (SENOKOT-S;PERICOLACE) 8.6-50 MG per tablet Take 1-2 tablets by mouth At Bedtime  Qty: 100 tablet, Refills: 0    Associated Diagnoses: Metastatic multiple myeloma to bone (H)         CONTINUE these medications which have NOT CHANGED    Details   omeprazole (PRILOSEC) 20 MG capsule Take 1 capsule (20 mg) by mouth daily  Qty: 30 capsule, Refills: 3    Associated Diagnoses: Bronchitis with bronchospasm      metoprolol (LOPRESSOR) 25 MG tablet Take 1 tablet (25 mg) by mouth 2 times daily  Qty: 180 tablet, Refills: 3    Associated Diagnoses: Persistent atrial fibrillation (H)      nepafenac (NEVANAC) 0.1 % ophthalmic suspension Place 1 drop Into the left eye 2 times daily  Qty: 1 Bottle, Refills: 3    Associated Diagnoses: Senile nuclear sclerosis, left         STOP taking these medications       ferrous sulfate (IRON) 325 (65 FE) MG tablet Comments:   Reason for Stopping:         potassium chloride SA (K-DUR/KLOR-CON M) 20 MEQ CR tablet Comments:   Reason for Stopping:         furosemide (LASIX) 20 MG tablet Comments:   Reason for Stopping:         multivitamin, therapeutic with minerals (THERA-VIT-M) TABS Comments:   Reason for Stopping:         LISINOPRIL PO Comments:   Reason for Stopping:                     Consultations:   No consultations were requested during this admission            Discharge Exam:   Blood pressure 110/65, pulse 99, temperature 97.7  F (36.5  C), temperature source Oral, resp. rate 16, height 1.778 m (5' 10\"), weight 70.2 kg (154 lb " 12.2 oz), SpO2 97 %.  GENERAL APPEARANCE:ill appearing--somnolant and no distress      Unresulted Labs Ordered in the Past 30 Days of this Admission     No orders found from 12/2/2016 to 1/31/2017.          No results found for this or any previous visit (from the past 24 hour(s)).         Pending Tests at Discharge:   None         Discharge Instructions and Follow-Up:   Discharge diet: Regular   Discharge activity: Activity as tolerated   Discharge follow-up: hospice           Discharge Disposition:   Discharged to nursing home      Attestation:  I have reviewed today's vital signs, notes, medications, labs and imaging.    Time Spent on This Encounter  I, Izzy Preston, personally saw the patient today and spent greater than 30 minutes discharging this patient.    Izzy Preston MD
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
## 2017-02-01 NOTE — PLAN OF CARE
Problem: Goal Outcome Summary  Goal: Goal Outcome Summary  Outcome: No Change  Pt up in chair and wanted to go to bed. Pt states he sleeps daily .

## 2017-02-01 NOTE — PROGRESS NOTES
Reason for Follow up: DC planning    Anticipated discharge needs: Pt has been accepted at [facility] (Phone: [phone]) Fax: [phone]) for as early as tomorrow. A ride has been arranged at 1030 with [employer]. Dtr in agreement with plan. A referral for [facility] (phone: [phone] Fax: [phone]) has been made.     [facility] has also been notified of dc plan.     Next steps: DC tomorrow at 1030    Domonique Myers MSW, LICSW, [phone]      PAS-RR    Per DHS regulation, CTS team completed and submitted PAS-RR to [place] [employer]ging Direct Connect via the [employer]. CTS team advised SNF and they are aware a PAS-RR has been submitted.     CTS team reviewed with pt or health care agent that they may be contacted for a follow up appointment within 10 days of hospital discharge if SNF stay is <30 days. Contact information for [employer] was also provided.     Pt or health care agent verbalized understanding.     PAS-RR # [id number]

## 2017-02-01 NOTE — PLAN OF CARE
Problem: Goal Outcome Summary  Goal: Goal Outcome Summary  Outcome: Improving  Incontinent of urine x 1-otherwise continent.  Confused at times, needs reminders to orientation.  Sets off alarms, does not use call light.

## 2017-02-01 NOTE — PLAN OF CARE
Problem: Discharge Planning  Goal: Discharge Planning (Adult, OB, Behavioral, Peds)  Outcome: No Change  Plans of going today have been changed to tomorrow,  to [facility].     Problem: Skin Integrity Impairment, Risk/Actual (Adult)  Goal: Skin Integrity/Wound Healing  Patient will demonstrate the desired outcomes by discharge/transition of care.   Outcome: No Change  Scabs on legs intact.

## 2017-02-01 NOTE — PHARMACY - DISCHARGE MEDICATION RECONCILIATION
Discharge medication review for this patient is complete.      The following changes were made to the discharge medication list based on pharmacist review:  Added:  n/a  Discontinued: n/a  Changed: n/a      Patient's Discharge Medication List  - medications as listed on After Visit Summary (AVS)     Review of your medicines      START taking       Dose / Directions    morphine sulfate HIGH CONCENTRATE 100 MG/5ML (HIGH CONC) solution   Commonly known as:  ROXANOL *CONCENTRATED*   Used for:  Dyspnea, unspecified type, Metastatic multiple myeloma to bone (H)        Dose:  5-10 mg   Take 0.25-0.5 mLs (5-10 mg) by mouth every 2 hours as needed for moderate to severe pain or shortness of breath / dyspnea   Quantity:  30 mL   Refills:  0       order for DME   Used for:  Dyspnea, unspecified type        Equipment being ordered: Oxygen PRN for comfort; may range flow between 1-6 lpm   Quantity:  1 each   Refills:  0       senna-docusate 8.6-50 MG per tablet   Commonly known as:  SENOKOT-S;PERICOLACE   Used for:  Metastatic multiple myeloma to bone (H)        Dose:  1-2 tablet   Take 1-2 tablets by mouth At Bedtime   Quantity:  100 tablet   Refills:  0         CONTINUE these medicines which have NOT CHANGED       Dose / Directions    metoprolol 25 MG tablet   Commonly known as:  LOPRESSOR   Used for:  Persistent atrial fibrillation (H)        Dose:  25 mg   Take 1 tablet (25 mg) by mouth 2 times daily   Quantity:  180 tablet   Refills:  3       nepafenac 0.1 % ophthalmic susp   Commonly known as:  NEVANAC   Used for:  Senile nuclear sclerosis, left        Dose:  1 drop   Place 1 drop Into the left eye 2 times daily   Quantity:  1 Bottle   Refills:  3       omeprazole 20 MG CR capsule   Commonly known as:  priLOSEC   Used for:  Bronchitis with bronchospasm        Dose:  20 mg   Take 1 capsule (20 mg) by mouth daily   Quantity:  30 capsule   Refills:  3         STOP taking          ferrous sulfate 325 (65 FE) MG tablet    Commonly known as:  IRON           furosemide 20 MG tablet   Commonly known as:  LASIX           LISINOPRIL PO           multivitamin, therapeutic with minerals Tabs tablet           potassium chloride SA 20 MEQ CR tablet   Commonly known as:  K-DUR/KLOR-CON M                Where to get your medicines      Some of these will need a paper prescription and others can be bought over the counter. Ask your nurse if you have questions.     Bring a paper prescription for each of these medications    - morphine sulfate HIGH CONCENTRATE 100 MG/5ML (HIGH CONC) solution  - order for DME    You don't need a prescription for these medications    - senna-docusate 8.6-50 MG per tablet          s

## 2017-02-02 PROBLEM — Z51.5 HOSPICE CARE PATIENT: Status: ACTIVE | Noted: 2017-01-01

## 2017-02-02 NOTE — DISCHARGE SUMMARY
[place] Hospitalist Discharge Summary    Keven Jasso MRN# [id number]   Age: 83 year old YOB: 1933     Date of Admission:  1/30/2017  Date of Discharge::  2/2/2017  Admitting Physician:  Jean Pierre Reyes MD  Discharge Physician:  Izzy Preston MD  Primary Physician: Chidi Jacobson  Transferring Facility: N/A     Home clinic: [facility]          Admission Diagnoses:   Anemia [D64.9]          Discharge Diagnosis:   Principle diagnosis: end stage MM with weakness/anemia.  Secondary diagnoses:  Active Problems:    Metastatic multiple myeloma to bone (H)    Failure to thrive in adult    Anemia         Brief History of Presenting Illness:   As per admit hx  Keven Jasso was apparently being seen by a  in his home because of concerns about his vulnerable status.  He was found in bed with stool and urine throughout the bed.  Police were called in and brought him into the emergency room.  It was felt he was unsafe to go home on his own.        He himself says he had just been up watching TV, and then overslept, and that was the problem.  He does not think there is any problem.  He tells me his appetite has been good.  He has been weak but able to get around with a cane.  He is not driving.        The social history is somewhat complicated in that he lives with a son and multiple family members of his, including six adults.  According to sisterShyann, there are significant drug problems in the house, and she feels the patient is very vulnerable there, but he prefers to stay in the house with his son, and therefore will say anything he can to stay in the home.        He does have multiple myeloma which is end-stage.  At his last visit with Oncology, they advised no further chemo, and consideration of palliative care, but it does not look like that has been set up.      No results found for this or any previous visit (from the past 24 hour(s)).         Hospital Course:    WEAKNESS due to end stage MM  Has hg of 6.4. No change post tx.    -going with palliative rx/hospice    ACUTE ON CRONIC ANEMIA  Due to end stage MM.    -no further interevntion    PANCYTOPENIA  Due to MM  -no intervention    HTN  Stable--low-nl  -continue metoprolol    ACUTE METABOLIC ENCEPHALOPATHY  Likely due to progressive cancer. Pt appears more somnolant today. Palliative care see. Pt to go to NH with hospice today    DISPO  To NH on hospice            Procedures:   No procedures performed during this admission         Allergies:      Allergies   Allergen Reactions     Nka [No Known Allergies]              Medications Prior to Admission:     Prescriptions prior to admission   Medication Sig Dispense Refill Last Dose     omeprazole (PRILOSEC) 20 MG capsule Take 1 capsule (20 mg) by mouth daily 30 capsule 3 Past Week at Unknown time     metoprolol (LOPRESSOR) 25 MG tablet Take 1 tablet (25 mg) by mouth 2 times daily 180 tablet 3 Past Week at Unknown time     nepafenac (NEVANAC) 0.1 % ophthalmic suspension Place 1 drop Into the left eye 2 times daily 1 Bottle 3 Past Week at Unknown time     [DISCONTINUED] ferrous sulfate (IRON) 325 (65 FE) MG tablet Take 1 tablet (325 mg) by mouth 2 times daily 60 tablet 11 Past Week at Unknown time     [DISCONTINUED] potassium chloride SA (K-DUR/KLOR-CON M) 20 MEQ CR tablet Take 1 tablet (20 mEq) by mouth daily 30 tablet 0 Past Week at Unknown time     [DISCONTINUED] furosemide (LASIX) 20 MG tablet Take 1 tablet (20 mg) by mouth daily 30 tablet 3 Past Week at Unknown time     [DISCONTINUED] multivitamin, therapeutic with minerals (THERA-VIT-M) TABS Take 1 tablet by mouth daily 30 each 0 Past Week at Unknown time     [DISCONTINUED] LISINOPRIL PO Take 2.5 mg by mouth daily    Past Week at Unknown time             Discharge Medications:     Current Discharge Medication List      START taking these medications    Details   order for DME Equipment being ordered: Oxygen PRN for  "comfort; may range flow between 1-6 lpm  Qty: 1 each, Refills: 0    Associated Diagnoses: Dyspnea, unspecified type      morphine sulfate HIGH CONCENTRATE (ROXANOL *CONCENTRATED*) 100 MG/5ML (HIGH CONC) solution Take 0.25-0.5 mLs (5-10 mg) by mouth every 2 hours as needed for moderate to severe pain or shortness of breath / dyspnea  Qty: 30 mL, Refills: 0    Associated Diagnoses: Dyspnea, unspecified type; Metastatic multiple myeloma to bone (H)      senna-docusate (SENOKOT-S;PERICOLACE) 8.6-50 MG per tablet Take 1-2 tablets by mouth At Bedtime  Qty: 100 tablet, Refills: 0    Associated Diagnoses: Metastatic multiple myeloma to bone (H)         CONTINUE these medications which have NOT CHANGED    Details   omeprazole (PRILOSEC) 20 MG capsule Take 1 capsule (20 mg) by mouth daily  Qty: 30 capsule, Refills: 3    Associated Diagnoses: Bronchitis with bronchospasm      metoprolol (LOPRESSOR) 25 MG tablet Take 1 tablet (25 mg) by mouth 2 times daily  Qty: 180 tablet, Refills: 3    Associated Diagnoses: Persistent atrial fibrillation (H)      nepafenac (NEVANAC) 0.1 % ophthalmic suspension Place 1 drop Into the left eye 2 times daily  Qty: 1 Bottle, Refills: 3    Associated Diagnoses: Senile nuclear sclerosis, left         STOP taking these medications       ferrous sulfate (IRON) 325 (65 FE) MG tablet Comments:   Reason for Stopping:         potassium chloride SA (K-DUR/KLOR-CON M) 20 MEQ CR tablet Comments:   Reason for Stopping:         furosemide (LASIX) 20 MG tablet Comments:   Reason for Stopping:         multivitamin, therapeutic with minerals (THERA-VIT-M) TABS Comments:   Reason for Stopping:         LISINOPRIL PO Comments:   Reason for Stopping:                     Consultations:   No consultations were requested during this admission            Discharge Exam:   Blood pressure 102/68, pulse 78, temperature 97.7  F (36.5  C), temperature source Oral, resp. rate 18, height 1.778 m (5' 10\"), weight 70.2 kg (154 lb " 12.2 oz), SpO2 95 %.  GENERAL APPEARANCE:ill appearing--somnolant and no distress      Unresulted Labs Ordered in the Past 30 Days of this Admission     No orders found from 12/2/2016 to 1/31/2017.          No results found for this or any previous visit (from the past 24 hour(s)).         Pending Tests at Discharge:   None         Discharge Instructions and Follow-Up:   Discharge diet: Regular   Discharge activity: Activity as tolerated   Discharge follow-up: hospice           Discharge Disposition:   Discharged to nursing home      Attestation:  I have reviewed today's vital signs, notes, medications, labs and imaging.    Time Spent on This Encounter  I, Izzy Preston, personally saw the patient today and spent greater than 30 minutes discharging this patient.    Izzy Preston MD

## 2017-02-02 NOTE — PLAN OF CARE
Problem: Goal Outcome Summary  Goal: Goal Outcome Summary  Patient has slept through most of the night. Is impulsive and bed alarm remains in place. Patient frequently wants PCDs removed. Incontinent x1 during the night. Patient using walker and assist x1 to bathroom. Up to chair and bed. Scab on right knee noted. Small, less than pea size wound noted on coccyx and wound consult order placed. VSS. IV saline locked and flushes well.

## 2017-02-02 NOTE — PLAN OF CARE
Problem: Goal Outcome Summary  Goal: Goal Outcome Summary  Outcome: Adequate for Discharge Date Met:  02/02/17  MARTINE MICHELLE DISCHARGE NOTE    Patient discharged to nursing home at 10:30 AM via wheel chair. Accompanied by [facility] transport. Report given to Adela at [facility].  Orders faxed and sent with pt.  Hospice to meet with pt at [facility].  All belongings sent with pt.

## 2017-02-02 NOTE — DISCHARGE INSTRUCTIONS
From Coy Hawthorne, Palliative Care NP, Cell [phone]        End stage multiple myeloma    [facility], [phone] on call 24/7    Morphine (roxanol) for cough, breathlessness, pain    Note that INR has been rising despite no blood thinners; I advise no ASA or NSAIDs be given    OK to place on O2 PRN for comfort.  Use 1-6 lpm as needed for comfort.      Anemic on discharge; no transfusions.  Hgb dropping fairly rapidly in spite of transfusion on admission; platelets also falling pretty rapidly.  Creatinine also trending up.      Weak, fall risk.    10-lb weight loss in 3 months (6%); albumin falling pretty rapidly.  Expected due to end stage disease.     Prognosis: days to weeks    Patient pleasant but confused and lacks decisional capacity.  DAughter Shyann is primary decision-maker; her phone number is noted on the POLST form.     Stated he is [employer] and [employer] but didn't say anything approvingly of either Scientologist.  Ask Shyann if she thinks he would want any sacraments or special blessings prior to death.      Thank you.     ++++++++++++++++++++++++++++++++++++++++++++++++++++++++++++++++++++++++++  From wound care -   Has a small stage II pressure ulcer on coccyx, this is only 2x3mm. Recommend TID, (and PRN following incontinence cares,) cleansing and barrier cream application.  Assist with turning every 2 hours as needed and maintain HOB elevation <30 degrees as able, use chair cushion when up and limiting sitting to 1 hour at a time as able.  Needs regular incontinence cares including barrier cream use.

## 2017-02-02 NOTE — PROGRESS NOTES
Reason For Visit: Keven Jasso, 83 year old male, seen for a [facility] consultation to evaluate and treat coccyx wound. Patient was admitted on 1/30/17 for weakness, vulnerable status at home, does have end stage multiple myeloma with pancytopenia.  He is being discharged today to a care facility.      History: Per admitting H&P:  HISTORY OF PRESENT ILLNESS:  Keven Jasso was apparently being seen by a  in his home because of concerns about his vulnerable status.  He was found in bed with stool and urine throughout the bed.  Police were called in and brought him into the emergency room.  It was felt he was unsafe to go home on his own.        He himself says he had just been up watching TV, and then overslept, and that was the problem.  He does not think there is any problem.  He tells me his appetite has been good.  He has been weak but able to get around with a cane.  He is not driving.        The social history is somewhat complicated in that he lives with a son and multiple family members of his, including six adults.  According to sisterShyann, there are significant drug problems in the house, and she feels the patient is very vulnerable there, but he prefers to stay in the house with his son, and therefore will say anything he can to stay in the home.        He does have multiple myeloma which is end-stage.  At his last visit with Oncology, they advised no further chemo, and consideration of palliative care, but it does not look like that has been set up.        SOCIAL HISTORY:  He lives with his son and daughter-in-law and their multiple adult children.  He has had no tobacco for years.  No alcohol for years.  He has been admitted three times in the last year and a half for social concerns similar to this.        FAMILY HISTORY:  Father had MI.  Mother had breast cancer.  A sister had MI.        REVIEW OF SYSTEMS:  Ten-point review of systems with the patient was negative except for a  2-month history of a cough and now about a 2-week history of some shortness of breath.  He admits he is weak, but thinks he has been able to get around okay with a walker.  He does not go very far, from the bed to a chair.  Denies any cardiorespiratory, GI or  complaints.  He thinks he had some blood in his stool a year ago and maybe recently once.  His hemoglobins have been sliding lower.        OBJECTIVE:    GENERAL:  He is weak, frail, alert.  Appears oriented x3; knows the month, the president, the year, where he is in the hospital, and where he lives.    VITAL SIGNS:  Afebrile.  Pulse is 106.  Respirations 16.  Blood pressure 104/53.  O2 sat 92% on room air.    HEENT:  Eyes show pupils somewhat dilated on the left.  The right is fixed and dilated and has no vision whatsoever, and the right also has significant ptosis.  EOMs on the left are intact.  TMs normal.  Nasal mucosa normal.  Throat is normal.    NECK:  Supple, without masses, nodes or thyromegaly.    CHEST:  Clear, but decreased breath sounds on the left compared to the right.  No rales or rhonchi or signs of consolidation.    CARDIOVASCULAR:  Regular rate and rhythm without murmur that I can hear.  Soft heart sounds.  No JVD or HJR.  No edema.  Pulses are thready but palpable in the pedals.  There is no edema.    ABDOMEN:  Soft, nontender, without HSM or masses.    GENITOURINARY:  Normal male, descended.    RECTAL:  Exam done by the ED was positive for Hemoccult, but brown stool.    EXTREMITIES:  He has very dirty fingernails.  He has some weakness in the left shoulder which he states is from pain, and has a hard time holding that arm up against gravity, as it tends to drift downward fairly quickly.  Passive range of motion, however, is good.    SKIN:  He is generally dirty.  He has smears of stool up his back.  No areas of obvious skin breakdown.    NEURO:  Equal strength, sensation and rapid alternating movements in the fingers and hands and  wrists.  Again, the left shoulder seems weaker, though this may be a pain behavior.  Lower extremities show good strength, sensation, and DTRs.        LABS:  Hemoglobin is 6.7, white count is 3.5, platelet count 93,000.  Last hemoglobin was 6.9, and before that, earlier in the month it was 8.1.  Creatinine is 1.27, with a baseline of about 1.5.  Albumin is 1.3, with a baseline of about 1.9.  INR 1.61, PTT 77; this is on no anticoagulation.        Chest x-ray is pending, ordered.        ASSESSMENT:    1.  Generalized severe weakness secondary to end-stage multiple myeloma:  We will transfuse and see if we can get some improvement in his strength.  I will ask Physical Therapy see him.  I fear this is actually just end-stage multiple myeloma.  No further chemotherapy is recommended by Oncology.  They have recommended consideration of palliative care.    2.  Vulnerable adult:  He was found in very unsanitary conditions, lying in bed in stool and urine.  According to the sisterShyann, the brother and his extended family are living in the house, and there is significant dysfunction and potential drug use.  He likely needs an alternative living situation.    3.  End-stage multiple myeloma:  No further chemotherapy is warranted.  He has multiple sites of this; see below.    4.  Pancytopenia:  This is secondary to his multiple myeloma.  We will transfuse to see if we can get him some strengths temporarily, but I think this is a temporizing measure.    5.  Coagulopathy:  INR and PTT are both elevated.  I suspect this is due to the dysproteinemia of multiple myeloma.  Would try some vitamin K and see if it makes a difference, though I suspect it will not.    6.  CODE STATUS:  DNR/DNI.  This was discussed at this time.    7.  Prophylaxis:  Mechanical for now.  He has had some heme-positive stools and has intrinsic coagulopathy.    8.  Disposition:  I believe he needs inpatient care for further discussion, primarily hopefully  getting him into a hospice situation.  We will have Palliative Care see him in the morning.  We will transfuse for now as well.  He is a vulnerable adult, and we will have  assess for improved living arrangements.        Objective:   Physical appearance: elderly, frail, thin  Mobility: able to stand and take steps with walker and stand by assist of 1  Current treatment plan: barrier cream    Wound #1 coccyx  Stage/tissue depth: partial  0.3 cm L x 0.2 cm W x flush cm D  Tunneling: no  Undermining: no  Wound bed type/amount: pink; non fluctuant  Wound Edges: attached  Periwound: , slight maceration  Odor: no  Pain: no      Pt is incontinent of stool and urine.  Has been restless and getting up independently at times.  Christoph 17.  Chair cushion in use.       Assessment:  Very small stage II pressure injury of coccyx, hospital acquired    Pt's poor health, insufficient adipose tissue, incontinence and restlessness, (causing shear,) likely contributing factors      Plan: Continue current interventions including incontinence cares, barrier cream TID and PRN, (do not anticipate given location that dressing retention is reasonable,) chair cushion, limit HOB elevation, and regular turning.  Pt going to a care facility, directions provided for care facility.  Pt has been referred to hospice care who can continue to monitor and adjust treatment accordingly.      Face to face time (excluding procedure): approximately 15 minutes.  Yuli Jiménez RN, CWOCN     [phone]